# Patient Record
Sex: FEMALE | Race: WHITE | HISPANIC OR LATINO
[De-identification: names, ages, dates, MRNs, and addresses within clinical notes are randomized per-mention and may not be internally consistent; named-entity substitution may affect disease eponyms.]

---

## 2021-01-19 ENCOUNTER — TRANSCRIPTION ENCOUNTER (OUTPATIENT)
Age: 31
End: 2021-01-19

## 2021-03-10 ENCOUNTER — TRANSCRIPTION ENCOUNTER (OUTPATIENT)
Age: 31
End: 2021-03-10

## 2021-07-13 ENCOUNTER — TRANSCRIPTION ENCOUNTER (OUTPATIENT)
Age: 31
End: 2021-07-13

## 2021-10-27 ENCOUNTER — TRANSCRIPTION ENCOUNTER (OUTPATIENT)
Age: 31
End: 2021-10-27

## 2022-03-10 PROBLEM — Z00.00 ENCOUNTER FOR PREVENTIVE HEALTH EXAMINATION: Status: ACTIVE | Noted: 2022-03-10

## 2022-03-16 ENCOUNTER — APPOINTMENT (OUTPATIENT)
Dept: PEDIATRIC ALLERGY IMMUNOLOGY | Facility: CLINIC | Age: 32
End: 2022-03-16
Payer: COMMERCIAL

## 2022-03-16 VITALS
HEIGHT: 69 IN | WEIGHT: 266.5 LBS | BODY MASS INDEX: 39.47 KG/M2 | DIASTOLIC BLOOD PRESSURE: 72 MMHG | SYSTOLIC BLOOD PRESSURE: 114 MMHG

## 2022-03-16 DIAGNOSIS — J30.9 ALLERGIC RHINITIS, UNSPECIFIED: ICD-10-CM

## 2022-03-16 DIAGNOSIS — T78.1XXA OTHER ADVERSE FOOD REACTIONS, NOT ELSEWHERE CLASSIFIED, INITIAL ENCOUNTER: ICD-10-CM

## 2022-03-16 DIAGNOSIS — E73.9 LACTOSE INTOLERANCE, UNSPECIFIED: ICD-10-CM

## 2022-03-16 PROCEDURE — 95004 PERQ TESTS W/ALRGNC XTRCS: CPT

## 2022-03-16 PROCEDURE — 99203 OFFICE O/P NEW LOW 30 MIN: CPT | Mod: 25

## 2022-03-16 NOTE — HISTORY OF PRESENT ILLNESS
[de-identified] : Ambika Hernandez is a 31 year female  with a history of sinus headaches, stomach pain and cramps that gets worse after eating mostly everything especially dairy. Patient states she had dairy allergy as a child but as she grew up it got better. She also went vegan 5 years for 3 years and during that period her symptoms improved. She also saw a nutritionist a few months ago and she was told she is allergic to a lot of foods (wheat, egg, dairy, corn). Patient also has history of seasonal allergies (sinus pressure, congestion and runny nose) which her symptoms are manageable and does not takes allergy medications. Pt is here for an initial consultation to check for possible food allergens. \par \par She says that she gets cramping has intermittent issues.

## 2022-03-16 NOTE — PHYSICAL EXAM
[Alert] : alert [Well Nourished] : well nourished [Healthy Appearance] : healthy appearance [Well Developed] : well developed [No Acute Distress] : no acute distress [Normal Pupil & Iris Size/Symmetry] : normal pupil and iris size and symmetry [No Discharge] : no discharge [No Photophobia] : no photophobia [Sclera Not Icteric] : sclera not icteric [Normal TMs] : both tympanic membranes were normal [Normal Nasal Mucosa] : the nasal mucosa was normal [Normal Lips/Tongue] : the lips and tongue were normal [Normal Outer Ear/Nose] : the ears and nose were normal in appearance [Normal Tonsils] : normal tonsils [No Thrush] : no thrush [Supple] : the neck was supple [Normal Rate and Effort] : normal respiratory rhythm and effort [No Crackles] : no crackles [No Retractions] : no retractions [Bilateral Audible Breath Sounds] : bilateral audible breath sounds [Normal Rate] : heart rate was normal  [Normal S1, S2] : normal S1 and S2 [No murmur] : no murmur [Regular Rhythm] : with a regular rhythm [Normal Cervical Lymph Nodes] : cervical [Skin Intact] : skin intact  [No Rash] : no rash [No Skin Lesions] : no skin lesions [No clubbing] : no clubbing [No Edema] : no edema [No Cyanosis] : no cyanosis [Normal Mood] : mood was normal [Normal Affect] : affect was normal [Alert, Awake, Oriented as Age-Appropriate] : alert, awake, oriented as age appropriate [Pale mucosa] : no pale mucosa

## 2022-03-16 NOTE — SOCIAL HISTORY
[Apartment] : [unfilled] lives in an apartment  [Radiator/Baseboard] : heating provided by radiator(s)/baseboard(s) [Window Units] : air conditioning provided by window units [Humidifier] : uses a humidifier [None] : none [] :  [Dehumidifier] : does not use a dehumidifier [Cockroaches] : Patient states that there are no cockroaches in the home [Bedroom] : not in the bedroom [Basement] : not in the basement [Living Area] : not in the living area [Smokers in Household] : there are no smokers in the home [de-identified] : area leslies

## 2022-03-16 NOTE — REVIEW OF SYSTEMS
[Rhinorrhea] : rhinorrhea [Nasal Congestion] : nasal congestion [Diarrhea] : diarrhea [Abdominal Pain] : abdominal pain [Nl] : Genitourinary [FreeTextEntry4] : sinus headaches  [FreeTextEntry7] : cramps

## 2022-03-16 NOTE — IMPRESSION
[Allergy Testing Dog] : dog [Allergy Testing Cat] : cat [Allergy Testing Trees] : trees [Allergy Testing Grasses] : grasses [________] : [unfilled] [Allergy Testing Dust Mite] : dust mites [Allergy Testing Mixed Feathers] : feathers [Allergy Testing Cockroach] : cockroach [] : molds [Allergy Testing Weeds] : weeds

## 2022-03-16 NOTE — ASSESSMENT
[FreeTextEntry1] : 1. AR - SPT to ENV - fluticasone nasal. \par \par 2. Lactose intolerance - Trial avoidance and eating \par \par 3. Adverse food reactions - Probiotic supplementation

## 2022-06-15 ENCOUNTER — APPOINTMENT (OUTPATIENT)
Dept: PEDIATRIC ALLERGY IMMUNOLOGY | Facility: CLINIC | Age: 32
End: 2022-06-15

## 2023-10-17 ENCOUNTER — NON-APPOINTMENT (OUTPATIENT)
Age: 33
End: 2023-10-17

## 2024-01-25 ENCOUNTER — APPOINTMENT (OUTPATIENT)
Dept: OBGYN | Facility: CLINIC | Age: 34
End: 2024-01-25
Payer: COMMERCIAL

## 2024-01-25 VITALS
WEIGHT: 260 LBS | BODY MASS INDEX: 37.22 KG/M2 | DIASTOLIC BLOOD PRESSURE: 83 MMHG | HEIGHT: 70 IN | SYSTOLIC BLOOD PRESSURE: 122 MMHG | HEART RATE: 94 BPM

## 2024-01-25 LAB
HCG UR QL: POSITIVE
QUALITY CONTROL: YES

## 2024-01-25 PROCEDURE — 99213 OFFICE O/P EST LOW 20 MIN: CPT

## 2024-01-25 PROCEDURE — 81025 URINE PREGNANCY TEST: CPT

## 2024-01-25 NOTE — PLAN
[FreeTextEntry1] : OFFICE TRANSVAGINAL SONOGRAM CONSISTENT WITH 9 WEEKS AND 1 DAY EDC OF 8/24/24 LAST MENSES WAS 11/18/23 . LABS ORDERED FOR PRENATAL PROFILE AND THROMBOPHILIA WORK UP . ALSO HER MOTHER HAS DIABETES. RTO 2 WEEKS

## 2024-01-25 NOTE — HISTORY OF PRESENT ILLNESS
[FreeTextEntry1] : Patient in office for confirmation of pregnancy. Patient had been confirmed to be pregnant by another physician and on progesterone 100 mg vaginal suppos for low progesterone.FAMILY HISTORY OF DVT MOTHER HAS ANTIPHOSPHOLIPID SYNDROME WITH =MTHFR MUTATION AND PROTHROMBIN GENE MUTATION POSITIVE AND PARTIAL PROTEIN C AND S DEFICIENCY . PATIENT HAS NEVER HAD A DVT. PATIENT HAS A PERSONAL HISTORY OF CLEFT LIP AT BIRTH THAT CLOSED SPONTANEOUSLY. PATIENT HAS ALLERGY TO PCN. DENIES VAGINAL BLEEDING AND ON DICLEGIS FOR NAUSEA.

## 2024-02-02 ENCOUNTER — LABORATORY RESULT (OUTPATIENT)
Age: 34
End: 2024-02-02

## 2024-02-06 ENCOUNTER — APPOINTMENT (OUTPATIENT)
Dept: ANTEPARTUM | Facility: CLINIC | Age: 34
End: 2024-02-06
Payer: COMMERCIAL

## 2024-02-06 ENCOUNTER — ASOB RESULT (OUTPATIENT)
Age: 34
End: 2024-02-06

## 2024-02-06 ENCOUNTER — OUTPATIENT (OUTPATIENT)
Dept: OUTPATIENT SERVICES | Facility: HOSPITAL | Age: 34
LOS: 1 days | End: 2024-02-06
Payer: COMMERCIAL

## 2024-02-06 DIAGNOSIS — Z34.90 ENCOUNTER FOR SUPERVISION OF NORMAL PREGNANCY, UNSPECIFIED, UNSPECIFIED TRIMESTER: ICD-10-CM

## 2024-02-06 LAB
25(OH)D3 SERPL-MCNC: 25 NG/ML
ABO + RH PNL BLD: NORMAL
BASOPHILS # BLD AUTO: 0.03 K/UL
BASOPHILS NFR BLD AUTO: 0.4 %
BLD GP AB SCN SERPL QL: NORMAL
CARDIOLIPIN AB SER IA-ACNC: NEGATIVE
CONFIRM: NORMAL
DEPRECATED CARDIOLIPIN IGA SER: <5 APL
DRVVT IMM 1:2 NP PPP: NORMAL
DRVVT SCREEN TO CONFIRM RATIO: 1.13 RATIO
EOSINOPHIL # BLD AUTO: 0.07 K/UL
EOSINOPHIL NFR BLD AUTO: 0.8 %
ESTIMATED AVERAGE GLUCOSE: 108 MG/DL
HBA1C MFR BLD HPLC: 5.4 %
HBV SURFACE AG SER QL: NONREACTIVE
HCG SERPL-MCNC: ABNORMAL MIU/ML
HCT VFR BLD CALC: 37.6 %
HCV AB SER QL: NONREACTIVE
HCV S/CO RATIO: 0.11 S/CO
HGB A MFR BLD: 97.6 %
HGB A2 MFR BLD: 2.4 %
HGB BLD-MCNC: 12.6 G/DL
HGB FRACT BLD-IMP: NORMAL
HIV1+2 AB SPEC QL IA.RAPID: NONREACTIVE
IMM GRANULOCYTES NFR BLD AUTO: 0.2 %
LYMPHOCYTES # BLD AUTO: 2.38 K/UL
LYMPHOCYTES NFR BLD AUTO: 28.7 %
MAN DIFF?: NORMAL
MCHC RBC-ENTMCNC: 29.6 PG
MCHC RBC-ENTMCNC: 33.5 G/DL
MCV RBC AUTO: 88.5 FL
MONOCYTES # BLD AUTO: 0.59 K/UL
MONOCYTES NFR BLD AUTO: 7.1 %
NEUTROPHILS # BLD AUTO: 5.21 K/UL
NEUTROPHILS NFR BLD AUTO: 62.8 %
PLATELET # BLD AUTO: 323 K/UL
PMV BLD AUTO: 0 /100 WBCS
PROGEST SERPL-MCNC: 19.46 NG/ML
PROT S AG ACT/NOR PPP IA: 73 %
RBC # BLD: 4.25 M/UL
RBC # FLD: 12.9 %
RUBV IGG FLD-ACNC: 1.4 INDEX
RUBV IGG SER-IMP: POSITIVE
SCREEN DRVVT: NORMAL
SILICA CLOTTING TIME INTERPRETATION: NORMAL
SILICA CLOTTING TIME S/C: 1.1 RATIO
T PALLIDUM AB SER QL IA: NEGATIVE
T4 FREE SERPL-MCNC: 1.1 NG/DL
TSH SERPL-ACNC: 1.74 UIU/ML
WBC # FLD AUTO: 8.3 K/UL

## 2024-02-06 PROCEDURE — 76801 OB US < 14 WKS SINGLE FETUS: CPT | Mod: 26

## 2024-02-06 PROCEDURE — 76813 OB US NUCHAL MEAS 1 GEST: CPT | Mod: 26

## 2024-02-06 PROCEDURE — 76801 OB US < 14 WKS SINGLE FETUS: CPT

## 2024-02-06 PROCEDURE — 76813 OB US NUCHAL MEAS 1 GEST: CPT

## 2024-02-07 DIAGNOSIS — O99.211 OBESITY COMPLICATING PREGNANCY, FIRST TRIMESTER: ICD-10-CM

## 2024-02-07 DIAGNOSIS — Z36.89 ENCOUNTER FOR OTHER SPECIFIED ANTENATAL SCREENING: ICD-10-CM

## 2024-02-07 DIAGNOSIS — Z36.82 ENCOUNTER FOR ANTENATAL SCREENING FOR NUCHAL TRANSLUCENCY: ICD-10-CM

## 2024-02-07 DIAGNOSIS — Z3A.11 11 WEEKS GESTATION OF PREGNANCY: ICD-10-CM

## 2024-02-08 ENCOUNTER — APPOINTMENT (OUTPATIENT)
Dept: OBGYN | Facility: CLINIC | Age: 34
End: 2024-02-08
Payer: COMMERCIAL

## 2024-02-08 LAB
BILIRUB UR QL STRIP: NORMAL
CLARITY UR: CLEAR
COLLECTION METHOD: NORMAL
GLUCOSE UR-MCNC: NORMAL
HCG UR QL: 0.2 EU/DL
HGB UR QL STRIP.AUTO: NORMAL
KETONES UR-MCNC: NORMAL
LEUKOCYTE ESTERASE UR QL STRIP: NORMAL
NITRITE UR QL STRIP: NORMAL
PH UR STRIP: 7
PROT UR STRIP-MCNC: NORMAL
SP GR UR STRIP: 1.02

## 2024-02-08 PROCEDURE — 81003 URINALYSIS AUTO W/O SCOPE: CPT | Mod: NC,QW

## 2024-02-08 PROCEDURE — 0502F SUBSEQUENT PRENATAL CARE: CPT

## 2024-02-22 ENCOUNTER — APPOINTMENT (OUTPATIENT)
Dept: OBGYN | Facility: CLINIC | Age: 34
End: 2024-02-22
Payer: COMMERCIAL

## 2024-02-22 VITALS
HEIGHT: 70 IN | DIASTOLIC BLOOD PRESSURE: 81 MMHG | BODY MASS INDEX: 38.08 KG/M2 | WEIGHT: 266 LBS | SYSTOLIC BLOOD PRESSURE: 114 MMHG | HEART RATE: 118 BPM

## 2024-02-22 PROCEDURE — 0502F SUBSEQUENT PRENATAL CARE: CPT

## 2024-02-22 PROCEDURE — 81003 URINALYSIS AUTO W/O SCOPE: CPT | Mod: NC,QW

## 2024-02-26 LAB
APCR PPP: 2.58 RATIO
AR GENE MUT ANL BLD/T: NORMAL
CFTR MUT TESTED BLD/T: NEGATIVE
CHROMOSOME13 INTERPRETATION: NORMAL
CHROMOSOME13 TEST RESULT: NORMAL
CHROMOSOME18 INTERPRETATION: NORMAL
CHROMOSOME18 TEST RESULT: NORMAL
CHROMOSOME21 INTERPRETATION: NORMAL
CHROMOSOME21 TEST RESULT: NORMAL
DNA PLOIDY SPEC FC-IMP: NORMAL
FETAL FRACTION: NORMAL
FMR1 GENE MUT ANL BLD/T: NORMAL
FVL BLANK: 36.9
FVL TEST: 95.2
HLX MTHFR FINAL REPORT: NORMAL
MICRODELETIONS INTERPRETATION: NORMAL
MICRODELETIONS RESULT: NORMAL
PERFORMANCE AND LIMITATIONS: NORMAL
PTR INTERP: NORMAL
SEX CHROMOSOME INTERPRETATION: NORMAL
SEX CHROMOSOME TEST RESULT: NORMAL
VERIFI WITH MICRODELETIONS: NOT DETECTED

## 2024-03-04 ENCOUNTER — APPOINTMENT (OUTPATIENT)
Dept: ANTEPARTUM | Facility: CLINIC | Age: 34
End: 2024-03-04
Payer: COMMERCIAL

## 2024-03-04 ENCOUNTER — OUTPATIENT (OUTPATIENT)
Dept: OUTPATIENT SERVICES | Facility: HOSPITAL | Age: 34
LOS: 1 days | End: 2024-03-04
Payer: COMMERCIAL

## 2024-03-04 VITALS
SYSTOLIC BLOOD PRESSURE: 119 MMHG | DIASTOLIC BLOOD PRESSURE: 73 MMHG | BODY MASS INDEX: 38.31 KG/M2 | OXYGEN SATURATION: 99 % | WEIGHT: 267 LBS | TEMPERATURE: 98.2 F

## 2024-03-04 DIAGNOSIS — O09.90 SUPERVISION OF HIGH RISK PREGNANCY, UNSPECIFIED, UNSPECIFIED TRIMESTER: ICD-10-CM

## 2024-03-04 DIAGNOSIS — O99.119 OTHER DISEASES OF THE BLOOD AND BLOOD-FORMING ORGANS AND CERTAIN DISORDERS INVOLVING THE IMMUNE MECHANISM COMPLICATING PREGNANCY, UNSPECIFIED TRIMESTER: ICD-10-CM

## 2024-03-04 DIAGNOSIS — D68.59 OTHER PRIMARY THROMBOPHILIA: ICD-10-CM

## 2024-03-04 DIAGNOSIS — D68.59 OTHER DISEASES OF THE BLOOD AND BLOOD-FORMING ORGANS AND CERTAIN DISORDERS INVOLVING THE IMMUNE MECHANISM COMPLICATING PREGNANCY, UNSPECIFIED TRIMESTER: ICD-10-CM

## 2024-03-04 DIAGNOSIS — Z33.1 PREGNANT STATE, INCIDENTAL: ICD-10-CM

## 2024-03-04 PROCEDURE — 96040: CPT

## 2024-03-04 PROCEDURE — 99212 OFFICE O/P EST SF 10 MIN: CPT

## 2024-03-04 PROCEDURE — 99212 OFFICE O/P EST SF 10 MIN: CPT | Mod: 25

## 2024-03-04 PROCEDURE — 81002 URINALYSIS NONAUTO W/O SCOPE: CPT

## 2024-03-04 NOTE — DISCUSSION/SUMMARY
[FreeTextEntry1] : 34yo  15.4 [place] 2024 presents for consultation for inherited prothrombin deficiency, currently managed by Dr. Smith.  Per patient, her mother has a history of prothombin mutation and lupus anticoagulant positive that was found after she was noted to have multiple DVTs in her leg. Her mother also has a hx of multiple first trimester miscarriages. Patients declines personal history of VTE, declines hx of miscarriages. Patients states she has a hx of cleft lip that resolved in utero. This is a naturally conceived pregnancy.  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, denies smoking, drinking, drug use  Vitals: 119/73, , SaO2 99%  Heart: S1, S2, no murmurs rubs or gallops Lungs: CTAB Abdomen: no tenderness, non-distended, gravid LE: no edema, no tenderness  Labs: CF neg, MTHFR heterozygous, Fragile X neg, Factor V neg, SMN1 neg, AT III antigen 22 (n), protein C resistance assay (n), cardiolipin (n), AT III activity 80% (L), protein S antigen (n), hemoglobin electrophoresis (n), B2G screen neg, rubella immune, HCV neg, Hep B NR, RPR NR, HIV NR,  aPTT wnl, O pos, antibody neg, free T4 1.1, TSH 1.74, Hb 5.4%, protein C 155% (H),   Ultrasounds: 11w3d: NT 2.63 (99%)  34yo  15.4 [place] 2024 presents for consultation for inherited prothrombin deficiency.  #prothrombin heterozygote -we discussed that given that prothrombin mutation is considered "low risk" by ACOG, without a personal history of VTE increases the risk but by so little that anticoagulation in not indicated in pregnancy. And with a family hx of first degree relative with VTE, it is optional. However, in light of obesity suggest Lovenox prophylaxis antepartum and postpartum.  her lupus labs have not resulted as of today. If lupus anticoagulant is positive, she will qualify for anticoagulation. -f/u lupus labs -s/p genetic consultation with genetic counselor  #Obesity  -We discussed that obesity is associated with a host of pregnancy complications.   -The patient is at increased risk for preeclampsia and gestational hypertension,  delivery, macrosomia, gestational diabetes, deep venous thrombosis,  delivery, stillbirth and certain birth defects, such as open neural tube defects.  - I recommend weekly biophysical profiles to start at around 32 weeks gestation.  - recommend early GCT, script given to patient  #MTFR mutation -recommend folate supplementation  #thickened nuchal -Verifi low risk, patient declining invasive testing  #pregnancy -recommend PNV -recommend ASA -AFP script sent -routine care per Dr. Smith  f/u for anatomy scan, or earlier for RPN if patient lupus labs are positive.

## 2024-03-06 NOTE — HISTORY OF PRESENT ILLNESS
[FreeTextEntry1] : REASON FOR VISIT: Ms. Ambika Hernandez is a 33-year-old  female of [place] descent with a gestational age of 15 weeks and 2 days based on an ARGENT[place] of 2024 who was seen for genetic counseling through [facility] on 3/4/2024 for a history of blood clot disorders. The patient was accompanied by her .   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 We discussed hereditary thrombophilia and the [id number] prothrombin phenotype. Hereditary thrombophilia is a predisposition to developing harmful blood clots. Harmful blood clots are more generally known as venous thromboembolism (VTE). When they form in the legs, the condition is known as deep vein thrombosis (DVT). When they travel to the lungs, the condition is known as pulmonary embolism (PE).  The [id number] point mutation in the prothrombin (coagulation factor II; F2) gene resulting from a substitution of adenine (A) for guanine (G) at position 82950 in a non-coding region of the gene. Inheritance is considered to be autosomal dominant, and the risk of VTE in heterozygotes is increased approximately 2-4 fold over controls. In  populations, the prevalence of prothrombin [id number] is approximately 1-6%, with an overall prevalence of approximately 2%.  There is contradictory literature on the association between maternal inherited thrombophilia and obstetric complications such as spontaneous , fetal loss, and stillbirth. Data at this time does not support the use of anticoagulants for E37341I prothrombin carriers with no prior history of blood clots to reduce the risk of obstetric complications. Estrogen-containing contraceptives are general avoided for individuals with prothrombin [id number] who have not had a VTE.    #Thickened Nuchal Translucency A sonogram was performed on __ and found a nuchal translucency (NT) measurement at the 99th percentile (__ mm) for this patients gestational age.  This patient was informed that an NT measurement at the the 99th percentile can be associated with an increased risk of chromosome abnormalities. An increased NT measurement is also associated with an increased risk for structural defects (eg. CHD). Level II anatomy scan at about 20 weeks gestation as well as a fetal echocardiogram at 18-22 weeks gestation are recommended due to the association of an increased NT and structural defects.   This patient was informed that for fetuses with NT measurement between the 95th-99th percentile, the vast majority of these cases will have normal pediatric outcomes.  The patient previously had cell-free fetal DNA screening performed for this pregnancy which was low risk for aneuploidies for the chromosomes studied (21, 18, 13, X, Y). Cell-free fetal DNA screening demonstrated a pregnancy consistent with male.   We discussed the option of prenatal diagnosis to detect chromosome abnormalities. We also discussed the option of limited chromosome screening with cell-free fetal DNA screening. The option of adding microarray analysis to prenatal diagnosis was recommended due to the increased occurrence of  microdeletion and microduplication syndromes in pregnancies with birth defects. This patient was also informed of the association of genetic syndromes (sporadic or inherited) with an increased NT.  One genetic syndrome discussed, that has been associated with an increased NT, and can be detected by prenatal diagnosis was Xochitl syndrome.    The patient was given the option of cell-free fetal DNA screening or diagnostic testing.  The risks, benefits, and limitations of each option were explained including the 1/400 risk of miscarriage or other complication with invasive testing. Diagnostic testing also allows for microarray analysis. Unlike CVS and amniocentesis which are diagnostic procedures, cell-free fetal DNA screening is a screening test with very high sensitivity and specificity for trisomies 21, 18, and 13. It can also detect sex chromosome aneuploidy and several microdeletion syndromes but with lower sensitivity and specificity. If cell-free fetal DNA screening is positive, ultrasound and amniocentesis are recommended for confirmation.  If cell-free fetal DNA screening is negative, it is not a guarantee of an unaffected child.  She also has the option not to test.   She was also offered [facility], cell-free fetal DNA screening which can screen for single gene disorders, such as [place] syndrome, as well as [facility], cell-free fetal DNA screening to screen all 23 pairs of chromosomes. The patient opted to pursue [facility] and [facility].  The patient declined diagnostic/invasive testing at this time.  FAMILY HISTORY: A 3 generation pedigree was obtained and will be scanned into the medical record. Of note, we discussed the following significant family history:   #Cancer This patient stated her maternal grandmother  of ovarian cancer in her early 50s. The patient was informed that while most cancer occurrences are sporadic in origin about 13% of cancer is inherited. We reviewed the availability of cancer genetic counseling and DNA testing, as per this patient's interest. The patient declined testing at this time.  The family history was otherwise negative for known significant birth defects, intellectual disability, consanguinity and known genetic diseases.  There is a 3-5% background risk for any birth defect or developmental issue with every pregnancy.     SUMMARY AND PLAN: - No further testing was ordered today.   The patient expressed understanding and all of her questions were answered in detail. If you have any additional questions, please feel free to call me at [phone] or [phone]. Thank you for referring this patient.   Sincerely,   Codie Dsouza MS, [facility] Genetic Counselor

## 2024-03-07 ENCOUNTER — APPOINTMENT (OUTPATIENT)
Dept: OBGYN | Facility: CLINIC | Age: 34
End: 2024-03-07

## 2024-03-07 ENCOUNTER — APPOINTMENT (OUTPATIENT)
Dept: MATERNAL FETAL MEDICINE | Facility: CLINIC | Age: 34
End: 2024-03-07
Payer: COMMERCIAL

## 2024-03-07 VITALS
WEIGHT: 270 LBS | HEART RATE: 107 BPM | HEIGHT: 70 IN | BODY MASS INDEX: 38.65 KG/M2 | DIASTOLIC BLOOD PRESSURE: 80 MMHG | SYSTOLIC BLOOD PRESSURE: 121 MMHG

## 2024-03-07 PROCEDURE — 99214 OFFICE O/P EST MOD 30 MIN: CPT

## 2024-03-07 RX ORDER — ISOPROPYL ALCOHOL 0.7 ML/ML
SWAB TOPICAL
Qty: 120 | Refills: 2 | Status: DISCONTINUED | COMMUNITY
Start: 2024-03-04 | End: 2024-03-07

## 2024-03-07 RX ORDER — ENOXAPARIN SODIUM 40 MG/.4ML
40 INJECTION, SOLUTION SUBCUTANEOUS DAILY
Qty: 30 | Refills: 5 | Status: DISCONTINUED | COMMUNITY
Start: 2024-03-04 | End: 2024-03-07

## 2024-03-11 ENCOUNTER — APPOINTMENT (OUTPATIENT)
Dept: ANTEPARTUM | Facility: CLINIC | Age: 34
End: 2024-03-11
Payer: COMMERCIAL

## 2024-03-11 ENCOUNTER — APPOINTMENT (OUTPATIENT)
Dept: MATERNAL FETAL MEDICINE | Facility: CLINIC | Age: 34
End: 2024-03-11
Payer: COMMERCIAL

## 2024-03-11 ENCOUNTER — ASOB RESULT (OUTPATIENT)
Age: 34
End: 2024-03-11

## 2024-03-11 VITALS
SYSTOLIC BLOOD PRESSURE: 120 MMHG | HEIGHT: 70 IN | HEART RATE: 100 BPM | BODY MASS INDEX: 38.51 KG/M2 | WEIGHT: 269 LBS | DIASTOLIC BLOOD PRESSURE: 70 MMHG

## 2024-03-11 LAB
BILIRUB UR QL STRIP: NORMAL
BP DIAS: 73 MM HG
BP SYS: 119 MM HG
CLARITY UR: CLEAR
COLLECTION METHOD: NORMAL
FETAL HEART RATE (BPM): 155
GLUCOSE UR-MCNC: NORMAL
HCG UR QL: 0.2 EU/DL
HGB UR QL STRIP.AUTO: NORMAL
KETONES UR-MCNC: NORMAL
LEUKOCYTE ESTERASE UR QL STRIP: NORMAL
NITRITE UR QL STRIP: NORMAL
OB COMMENTS: NORMAL
PH UR STRIP: 7
PROT UR STRIP-MCNC: NORMAL
SCHEDULED VISIT: YES
SP GR UR STRIP: 1.01
URINE ALBUMIN/PROTEIN: NORMAL
URINE GLUCOSE: NORMAL
URINE KETONES: NORMAL
WEEKS GESTATION: 15.4

## 2024-03-11 PROCEDURE — 76805 OB US >/= 14 WKS SNGL FETUS: CPT

## 2024-03-11 PROCEDURE — 76817 TRANSVAGINAL US OBSTETRIC: CPT

## 2024-03-11 PROCEDURE — 99213 OFFICE O/P EST LOW 20 MIN: CPT | Mod: 25

## 2024-03-11 NOTE — DISCUSSION/SUMMARY
[FreeTextEntry1] : 3/11/24 [facility] Att'g f/u Consult Note: Ms Hernandez is referred by Dr Smith. 32yo  16w2d (ARGENT[place] 08. by LMP c/w 11wMFMUS) returns for preliminary anatomic survey and f/u consultation.  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 - Antithrombin III "assay" = 80% (lower limits of nl); ATIII Ag is normal - MTHFR Heterozygote, on folate. PMHx: prothrombin heterozygote  No personal Hx of VTE or pregnancy loss. Meds: claritin PRN Allergies: cats Surgery: wisdom teeth, no complications. FHx. Per patient, her mother has a history of prothombin mutation and lupus anticoagulant positive that was found after she was noted to have multiple DVTs in her leg. Her mother also has a hx of multiple first trimester miscarriages. Patients declines personal history of VTE, declines hx of miscarriages. Patients states she has a hx of cleft lip that resolved in utero. Also, mother has DM2. Father has had an MI, pacemaker. Family Hx: Mom: T2DM, lupus, prothrombin mutation, pos lupus anticoagulant. 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 Vitals: 120/70,  5'10", 269#. BMI 39.  Abdomen: no tenderness, non-distended, gravid LE: no edema, no tenderness  Labs: Opos/AntibNeg; HbEP AA; Verifi NIPS risk reducing.    HCV neg, Hep B NR, RPR NR, HIV NR, aPTT wnl, free T4 1.1, TSH 1.74,    CF neg, Fragile X neg, MTHFR heterozygous, Factor V neg, SMN1 neg, AT III antigen 22 (n), protein C resistance assay (n),     cardiolipin (n), AT III activity 80% (L), protein S antigen (n), B2G screen neg, rubella immune, Hb 5.4%, protein C 155% (H),   2/6:  8k>13/38%<323k BUN/SCr: 8/0.8; AST/ALT: 18/10.  MFMUS: 24: SIUP at 11w3d ([place]  by LMPc/w 11wCRL). NT 2.63 (99%ile) 3/11/24:  Single fetus, variable lie at 16w2d. Ant plac. DVP 3.9cm,  EFW 34th%ile. NF 2.5mm, anatomy normal limited.  : Comprehensive anatomic survey scheduled.   Impression/Recommendations: 33y  15.4 [place] 2024 presents for consultation for inherited prothrombin deficiency. 1. Prothrombin Gene mutation heterozygote: We previously discussed that prothrombin mutation is considered "low risk" by ACOG. Without a personal history of VTE, PT heterozygosity increases the risk of VTE slightly, and generally risk of prophylaxis is greater than potential benefit. With a family hx of first degree relative with VTE, prophylaxis is optional. Pt would prefer not to take enoxaparin during pregnancy unless clearly indicated. Given her BMI = 38, prophylaxis would be reasonable in the 1st 6w postpartum, to which she agrees, but prefers po prophylaxis. Warfarin is safe during breastfeeding, so that may be prophylaxis of choice, despite need for monitoring. -->VTE prophylaxis x6w PP: Enoxaparin 40mg SQ dailystarting 24h after delivery. Pt may transition to warfarin as she prefers. 2. MTHFR Heterozygote: Pt is taking folate already. 3. Low normal AT III Assay, with normal AT III Ag: With no personal Hx VTE, these findings are normal. 4. FVLeiden, Lupus Anticoagulant Neg. Noted. -s/p genetic consultation with genetic counselor 5. BMI 38: Pt has been counseled regarding incr risk of preecl, CSx, GDM, LGA and other complications. -->Weekly biophysical profiles to start at around 32 weeks gestation. -->Early GCT, script given to patient 6. Increased NT: 2.6mm is below upper limits of normal.  -Verifi low risk, patient declined invasive testing 7. Pregnancy -recommend ASA MSAFP script sent -routine care per Dr. Smith -->RT  for early anatomy.  f/u for anatomy scan and consult .   MD Carlos, FACOG

## 2024-03-11 NOTE — DISCUSSION/SUMMARY
[FreeTextEntry1] : 3/7/24 [facility] Att'g f/u Consult Note:  Ms Hernandez is referred by Dr Smith.   32yo  15w6d ([place]  by LMP c/w 11wMFMUS) presents for consultation for   - PT heterozygous for the Factor II [id number] mutation, c.97G>A.    - Antithrombin III "assay" = 80% (lower limits of nl); ATIII Ag is normal - MTHFR Heterozygote, on folate.  PMHx: prothrombin heterozygote             No personal Hx of VTE or pregnancy loss.  Meds: claritin PRN Allergies: cats Surgery: wisdom teeth, no complications.  FHx. Per patient, her mother has a history of prothombin mutation and lupus anticoagulant positive that was found after she was noted to have multiple DVTs in her leg. Her mother also has a hx of multiple first trimester miscarriages. Patients declines personal history of VTE, declines hx of miscarriages. Patients states she has a hx of cleft lip that resolved in utero. This is a naturally conceived pregnancy. Also, mother has DM2.  Father has had an MI, pacemaker.  Family Hx: Mom: T2DM, lupus, prothrombin mutation, pos lupus anticoagulant.   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  Vitals: 121/80,  5'10", 270#. BMI 39.   Heart: S1, S2, no murmurs rubs or gallops Lungs: CTAB Abdomen: no tenderness, non-distended, gravid LE: no edema, no tenderness  Labs:  Opos/AntibNeg;  HbEP AA;  Verifi NIPS risk reducing.    HCV neg, Hep B NR, RPR NR, HIV NR, aPTT wnl, free T4 1.1, TSH 1.74,    CF neg, MTHFR heterozygous, Fragile X neg, Factor V neg, SMN1 neg, AT III antigen 22 (n), protein C resistance assay (n), cardiolipin (n), AT III activity 80% (L), protein S antigen (n), B2G screen neg, rubella immune, Hb 5.4%, protein C 155% (H), : 8k>13/38%<323k  BUN/SCr: 8/0.8; AST/ALT: 18/10.  MFMUS:    24:  SIUP at 11w3d ([place]  by LMPc/w 11wCRL). NT 2.63 (99%ile)  Impression/Recommendations:  32yo  15.4 [place] 2024 presents for consultation for inherited prothrombin deficiency. 1. Prothrombin Gene mutation heterozygote:   We discussed that given that prothrombin mutation is considered "low risk" by ACOG.  Without a personal history of VTE, PT heterozygosity increases the risk of VTE slightly, and generally risk of prophylaxis is greater than potential benefit.  With a family hx of first degree relative with VTE, prophylaxis is optional.  Pt would prefer not to take enoxaparin during pregnancy unless clearly indicated.  Given her BMI = 38, prophylaxis would be reasonable in the 1st 6w postpartum.  She would prefer po prophylaxis.   Warfarin is safe during breastfeeding, so that may be prophylaxis of choice, despite need for monitoring.   -->Enoxaparin 40mg SQ daily x6w post partum.  Pt may transition to warfarin if she prefers.   2. MTHFR Heterozygote:  Pt is taking folate already.  3. Low normal AT III Assay, with normal AT III Ag:  With no personal Hx VTE, these findings are normal.  4. FVLeiden, Lupus Anticoagulant Neg.  Noted.  -s/p genetic consultation with genetic counselor 5. BMI 38:  Pt has been counseled regarding incr risk of preecl, CSx, GDM, LGA and other complications.  -->Weekly biophysical profiles to start at around 32 weeks gestation. -->Early GCT, script given to patient 6. thickened nuchal -Verifi low risk, patient declining invasive testing 7. Pregnancy -recommend ASA MSAFP script sent -routine care per Dr. Smith -->RT 3/11 for early anatomy.   f/u for anatomy scan  MD Carlos, FACOG

## 2024-03-13 ENCOUNTER — APPOINTMENT (OUTPATIENT)
Dept: ANTEPARTUM | Facility: CLINIC | Age: 34
End: 2024-03-13

## 2024-03-15 DIAGNOSIS — Z3A.15 15 WEEKS GESTATION OF PREGNANCY: ICD-10-CM

## 2024-03-15 DIAGNOSIS — O99.212 OBESITY COMPLICATING PREGNANCY, SECOND TRIMESTER: ICD-10-CM

## 2024-03-15 DIAGNOSIS — Z34.90 ENCOUNTER FOR SUPERVISION OF NORMAL PREGNANCY, UNSPECIFIED, UNSPECIFIED TRIMESTER: ICD-10-CM

## 2024-03-15 DIAGNOSIS — O99.119 OTHER DISEASES OF THE BLOOD AND BLOOD-FORMING ORGANS AND CERTAIN DISORDERS INVOLVING THE IMMUNE MECHANISM COMPLICATING PREGNANCY, UNSPECIFIED TRIMESTER: ICD-10-CM

## 2024-03-21 ENCOUNTER — APPOINTMENT (OUTPATIENT)
Dept: OBGYN | Facility: CLINIC | Age: 34
End: 2024-03-21
Payer: COMMERCIAL

## 2024-03-21 VITALS
DIASTOLIC BLOOD PRESSURE: 82 MMHG | HEART RATE: 97 BPM | BODY MASS INDEX: 37.94 KG/M2 | HEIGHT: 70 IN | SYSTOLIC BLOOD PRESSURE: 137 MMHG | WEIGHT: 265 LBS

## 2024-03-21 PROCEDURE — 0502F SUBSEQUENT PRENATAL CARE: CPT

## 2024-04-10 ENCOUNTER — APPOINTMENT (OUTPATIENT)
Dept: ANTEPARTUM | Facility: CLINIC | Age: 34
End: 2024-04-10

## 2024-04-11 ENCOUNTER — APPOINTMENT (OUTPATIENT)
Dept: MATERNAL FETAL MEDICINE | Facility: CLINIC | Age: 34
End: 2024-04-11
Payer: COMMERCIAL

## 2024-04-11 ENCOUNTER — APPOINTMENT (OUTPATIENT)
Dept: ANTEPARTUM | Facility: CLINIC | Age: 34
End: 2024-04-11
Payer: COMMERCIAL

## 2024-04-11 ENCOUNTER — ASOB RESULT (OUTPATIENT)
Age: 34
End: 2024-04-11

## 2024-04-11 VITALS
WEIGHT: 267 LBS | SYSTOLIC BLOOD PRESSURE: 128 MMHG | DIASTOLIC BLOOD PRESSURE: 78 MMHG | HEART RATE: 116 BPM | HEIGHT: 70 IN | BODY MASS INDEX: 38.22 KG/M2

## 2024-04-11 DIAGNOSIS — O99.212 OBESITY COMPLICATING PREGNANCY, SECOND TRIMESTER: ICD-10-CM

## 2024-04-11 DIAGNOSIS — E66.01 OBESITY COMPLICATING PREGNANCY, SECOND TRIMESTER: ICD-10-CM

## 2024-04-11 PROCEDURE — 76811 OB US DETAILED SNGL FETUS: CPT

## 2024-04-11 PROCEDURE — 99215 OFFICE O/P EST HI 40 MIN: CPT | Mod: 25

## 2024-04-11 PROCEDURE — 76817 TRANSVAGINAL US OBSTETRIC: CPT

## 2024-04-11 NOTE — DISCUSSION/SUMMARY
[FreeTextEntry1] : 25 M Att'g f/u Consult Note: Ms Hernandez is referred by Dr Smith. 34yo  20w5d ([place]  by LMP c/w 11wMFMUS) returns for comprehensive anatomic survey and f/u consultation. She admits to occasional dizziness when standing, but otherwise feels very well.  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 - Antithrombin III "assay" = 80% (lower limits of nl); ATIII Ag is normal - MTHFR Heterozygote, on folate. PMHx: prothrombin heterozygote    No personal Hx of VTE or pregnancy loss. Meds: claritin PRN            ASA 81mg po daily            Folate 1mg daily. Allergies: cats Surgery: wisdom teeth, no complications. FHx. Per patient, her mother has a history of prothombin mutation and lupus anticoagulant positive that was found after she was noted to have multiple DVTs in her leg. Her mother also has a hx of multiple first trimester miscarriages. Patients has no personal history of VTE, no hx of miscarriages. She has a hx of congenital cleft lip that resolved in utero. Also, mother has DM2. Father has had an MI, pacemaker. 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 Vitals: 128/78,  5'10", 267<269#. BMI 38.   (US)     Udip () tr prot, neg glu, neg ket.  HEENT: NC/AT.  Upper left appears to have congenitally healed cleft on left, c/w pt history.  Lungs: Clear to ausc bilat. Cor: S1nl, S2phys split.  No mrb. rrr. Abdomen: no tenderness. gravid LE: 1+ pitting edema.   LAB: Opos/AntibNeg; HbEP AA; Verifi NIPS risk reducing. HCV neg, Hep B NR, RPR NR, HIV NR, aPTT wnl, free T4 1.1, TSH 1.74, CF neg, Fragile X neg,  PT heterozygous for the Factor II [id number] mutation, c.97G>A.MTHFR heterozygous, Factor V neg, SMN1 neg, AT III antigen 22 (n), protein C resistance assay (n), cardiolipin (n), AT III activity 80% (L), protein S antigen (n), B2G screen neg, rubella immune, Hb 5.4%, protein C 155% (H), : 8k>13/38%<323k BUN/SCr: 8/0.8; AST/ALT: 18/10.  MFMUS: 25: SIUP at 11w3d ([place]  by LMPc/w 11wCRL). NT 2.63 (99%ile) 3/11/24: Single fetus, variable lie at 16w2d. Ant plac. DVP 3.9cm, EFW 34th%ile. NF 2.5mm, anatomy normal limited. : SF BREECH at 20w5d. Ant plac. DVP 4.3cm. EFW 47th%ile.  Anatomy incomplete.   Impression/Recommendations: 33y  15.4 [place] 2024 presents for consultation for inherited prothrombin deficiency. 1. Prothrombin Gene mutation heterozygote: We previously discussed that prothrombin mutation is considered "low risk" by ACOG. Without a personal history of VTE, PT heterozygosity increases the risk of VTE slightly, and generally risk of prophylaxis is greater than potential benefit. With a family hx of first degree relative with VTE, prophylaxis is optional. Pt would prefer not to take enoxaparin during pregnancy unless clearly indicated. Given her BMI = 38, prophylaxis would be reasonable in the 1st 6w postpartum, to which she agrees, but prefers po prophylaxis. Warfarin is safe during breastfeeding, so that may be prophylaxis of choice, despite need for monitoring. -->VTE prophylaxis x6w PP: Enoxaparin 40mg SQ dailystarting 24h after delivery. Pt may transition to warfarin as she prefers. -->Referral for hematology consult.  Recommend [facility]ematology Dr Bundy.  2. MTHFR Heterozygote: Pt is taking folate already. 3. Low normal AT III Assay, with normal AT III Ag: With no personal Hx VTE, these findings are normal. 4. FVLeiden, Lupus Anticoagulant Neg. Noted. -s/p genetic consultation with genetic counselor 5. BMI 38: Pt has been counseled regarding incr risk of preecl, CSx, GDM, LGA and other complications. -->Weekly biophysical profiles to start at around 32 weeks gestation. -->Early GCT, script given to patient 6. Increased NT: 2.6mm is below upper limits of normal. -Verifi low risk, patient declined invasive testing 7. Pregnancy -recommend ASA MSAFP script sent -routine care per Dr. Smith -->RT  for f/u anatomy & MF Consult  MD Carlos, FACOG

## 2024-04-16 ENCOUNTER — APPOINTMENT (OUTPATIENT)
Dept: OBGYN | Facility: CLINIC | Age: 34
End: 2024-04-16
Payer: COMMERCIAL

## 2024-04-16 LAB
BILIRUB UR QL STRIP: NORMAL
CLARITY UR: CLEAR
COLLECTION METHOD: NORMAL
GLUCOSE UR-MCNC: NORMAL
HCG UR QL: 0.2 EU/DL
HGB UR QL STRIP.AUTO: NORMAL
KETONES UR-MCNC: NORMAL
LEUKOCYTE ESTERASE UR QL STRIP: NORMAL
NITRITE UR QL STRIP: NORMAL
PH UR STRIP: 7
PROT UR STRIP-MCNC: NORMAL
SP GR UR STRIP: 1.01

## 2024-04-16 PROCEDURE — 81003 URINALYSIS AUTO W/O SCOPE: CPT | Mod: NC,QW

## 2024-04-16 PROCEDURE — 0502F SUBSEQUENT PRENATAL CARE: CPT

## 2024-04-19 LAB
AFP MOM: 0.89
AFP VALUE: 40.7 NG/ML
ALPHA FETOPROTEIN SERUM COMMENT: NORMAL
ALPHA FETOPROTEIN SERUM INTERPRETATION: NORMAL
ALPHA FETOPROTEIN SERUM RESULTS: NORMAL
ALPHA FETOPROTEIN SERUM TEST RESULTS: NORMAL
GESTATIONAL AGE BASED ON: NORMAL
GESTATIONAL AGE ON COLLECTION DATE: 20.7 WEEKS
INSULIN DEP DIABETES: NO
MATERNAL AGE AT EDD AFP: 32.8 YR
MULTIPLE GESTATION: NO
OSBR RISK 1 IN: NORMAL
RACE: NORMAL
WEIGHT AFP: 267 LBS

## 2024-04-30 DIAGNOSIS — Z78.9 OTHER SPECIFIED HEALTH STATUS: ICD-10-CM

## 2024-04-30 DIAGNOSIS — Z34.90 ENCOUNTER FOR SUPERVISION OF NORMAL PREGNANCY, UNSPECIFIED, UNSPECIFIED TRIMESTER: ICD-10-CM

## 2024-04-30 DIAGNOSIS — Z82.49 FAMILY HISTORY OF ISCHEMIC HEART DISEASE AND OTHER DISEASES OF THE CIRCULATORY SYSTEM: ICD-10-CM

## 2024-04-30 LAB
ALBUMIN SERPL ELPH-MCNC: 4.1 G/DL
ALP BLD-CCNC: 50 U/L
ALT SERPL-CCNC: 10 U/L
ANION GAP SERPL CALC-SCNC: 13 MMOL/L
AST SERPL-CCNC: 18 U/L
AT III PPP CHRO-ACNC: 80 %
BILIRUB SERPL-MCNC: 0.2 MG/DL
BUN SERPL-MCNC: 8 MG/DL
CALCIUM SERPL-MCNC: 8.9 MG/DL
CHLORIDE SERPL-SCNC: 103 MMOL/L
CO2 SERPL-SCNC: 22 MMOL/L
CREAT SERPL-MCNC: 0.7 MG/DL
EGFR: 118 ML/MIN/1.73M2
GLUCOSE SERPL-MCNC: 80 MG/DL
POTASSIUM SERPL-SCNC: 4 MMOL/L
PROT C PPP CHRO-ACNC: 155 %
PROT SERPL-MCNC: 7.2 G/DL
SODIUM SERPL-SCNC: 138 MMOL/L

## 2024-04-30 RX ORDER — PNV NO.95/FERROUS FUM/FOLIC AC 28MG-0.8MG
TABLET ORAL
Refills: 0 | Status: ACTIVE | COMMUNITY
Start: 2024-04-30

## 2024-04-30 RX ORDER — CHROMIUM 200 MCG
10 MCG TABLET ORAL
Refills: 0 | Status: ACTIVE | COMMUNITY
Start: 2024-04-30

## 2024-04-30 RX ORDER — FLUTICASONE PROPIONATE 50 UG/1
50 SPRAY, METERED NASAL DAILY
Qty: 1 | Refills: 1 | Status: DISCONTINUED | COMMUNITY
Start: 2022-03-16 | End: 2024-04-30

## 2024-05-01 ENCOUNTER — NON-APPOINTMENT (OUTPATIENT)
Age: 34
End: 2024-05-01

## 2024-05-08 ENCOUNTER — APPOINTMENT (OUTPATIENT)
Dept: ANTEPARTUM | Facility: CLINIC | Age: 34
End: 2024-05-08
Payer: COMMERCIAL

## 2024-05-08 ENCOUNTER — APPOINTMENT (OUTPATIENT)
Dept: MATERNAL FETAL MEDICINE | Facility: CLINIC | Age: 34
End: 2024-05-08
Payer: COMMERCIAL

## 2024-05-08 ENCOUNTER — ASOB RESULT (OUTPATIENT)
Age: 34
End: 2024-05-08

## 2024-05-08 VITALS
HEART RATE: 118 BPM | DIASTOLIC BLOOD PRESSURE: 76 MMHG | HEIGHT: 70 IN | BODY MASS INDEX: 38.08 KG/M2 | SYSTOLIC BLOOD PRESSURE: 128 MMHG | WEIGHT: 266 LBS

## 2024-05-08 DIAGNOSIS — Z3A.23 23 WEEKS GESTATION OF PREGNANCY: ICD-10-CM

## 2024-05-08 DIAGNOSIS — Z3A.24 24 WEEKS GESTATION OF PREGNANCY: ICD-10-CM

## 2024-05-08 PROCEDURE — 76816 OB US FOLLOW-UP PER FETUS: CPT

## 2024-05-08 PROCEDURE — 99215 OFFICE O/P EST HI 40 MIN: CPT | Mod: 25

## 2024-05-08 NOTE — DISCUSSION/SUMMARY
[FreeTextEntry1] : 33-year-old gravida1 para 0 LMP 11/18.23 [place] 8/24/2024 presents for follow up consultation at 24 weeks and 3 days for inherited prothrombin deficiency, currently managed by Dr. Smith.  Per patient, her mother has a history of prothrombin mutation and lupus anticoagulant positive that was found after she was noted to have multiple DVTs in her leg. Her mother also has had multiple first trimester miscarriages. Patients declines personal history of VTE or miscarriages. Patients states she had a cleft lip that resolved in utero. This is a naturally conceived pregnancy.   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  Family history of first degree relative with VTE and obesity make her eligible for prophylaxis postpartum to which she agrees but prefers oral prophylaxis. Warfarin is safe during breastfeeding, so that may be prophylaxis of choice, despite need for monitoring.   #Obesity  She has gained 7 lb. this pregnancy. Our goal is to keep total weight gain to 15.  Met with Ms. Hernandez and the father of the baby. She feels well and the baby is active. /76 P 118 weight 266 lb. Anatomy scan completed. Fetal echo to be done. The couple were counselled and sent home with advice.

## 2024-05-14 ENCOUNTER — APPOINTMENT (OUTPATIENT)
Dept: OBGYN | Facility: CLINIC | Age: 34
End: 2024-05-14
Payer: COMMERCIAL

## 2024-05-14 VITALS
SYSTOLIC BLOOD PRESSURE: 97 MMHG | WEIGHT: 270 LBS | HEART RATE: 92 BPM | DIASTOLIC BLOOD PRESSURE: 72 MMHG | BODY MASS INDEX: 38.65 KG/M2 | HEIGHT: 70 IN

## 2024-05-14 DIAGNOSIS — Z34.90 ENCOUNTER FOR SUPERVISION OF NORMAL PREGNANCY, UNSPECIFIED, UNSPECIFIED TRIMESTER: ICD-10-CM

## 2024-05-14 PROCEDURE — 0502F SUBSEQUENT PRENATAL CARE: CPT

## 2024-05-14 PROCEDURE — 81003 URINALYSIS AUTO W/O SCOPE: CPT | Mod: NC,QW

## 2024-05-15 ENCOUNTER — APPOINTMENT (OUTPATIENT)
Dept: PEDIATRIC CARDIOLOGY | Facility: CLINIC | Age: 34
End: 2024-05-15
Payer: COMMERCIAL

## 2024-05-15 PROCEDURE — 99205 OFFICE O/P NEW HI 60 MIN: CPT | Mod: 25

## 2024-05-15 PROCEDURE — 76827 ECHO EXAM OF FETAL HEART: CPT

## 2024-05-15 PROCEDURE — 93325 DOPPLER ECHO COLOR FLOW MAPG: CPT

## 2024-05-15 PROCEDURE — 76825 ECHO EXAM OF FETAL HEART: CPT

## 2024-05-15 NOTE — HISTORY OF PRESENT ILLNESS
[FreeTextEntry1] : Dear Dr. ALEE AMOR,  I had the pleasure of seeing your patient, VALENCIA PEARCE, in my office today, 05/15/2024. She was referred to pediatric cardiology for fetal echocardiogram.  Limited visualization of heart on routine imaging. VALENCIA has been doing well from a clinical standpoint. There is no family history of congenital heart disease.  Today's echocardiogram was normal. Please see attached report.

## 2024-05-15 NOTE — DISCUSSION/SUMMARY
[FreeTextEntry1] : Normal study Reassurance Recommend routine prenatal care and delivery  Please do not hesitate to contact me if you have any questions.   Neri Small MD, MS, FAAP, FACC Attending Physician, Pediatric Cardiology [facility] [address] [place] [address] Office: [phone] Fax: [phone] Email: [email or web address]     I have spent 60 minutes of time on the encounter excluding separately reported services.

## 2024-05-30 LAB
BASOPHILS # BLD AUTO: 0.03 K/UL
BASOPHILS NFR BLD AUTO: 0.3 %
EOSINOPHIL # BLD AUTO: 0.09 K/UL
EOSINOPHIL NFR BLD AUTO: 1 %
GLUCOSE 1H P 50 G GLC PO SERPL-MCNC: 112 MG/DL
HCT VFR BLD CALC: 33.7 %
HGB BLD-MCNC: 10.9 G/DL
IMM GRANULOCYTES NFR BLD AUTO: 0.2 %
LYMPHOCYTES # BLD AUTO: 1.98 K/UL
LYMPHOCYTES NFR BLD AUTO: 23 %
MAN DIFF?: NORMAL
MCHC RBC-ENTMCNC: 30.3 PG
MCHC RBC-ENTMCNC: 32.3 G/DL
MCV RBC AUTO: 93.6 FL
MONOCYTES # BLD AUTO: 0.61 K/UL
MONOCYTES NFR BLD AUTO: 7.1 %
NEUTROPHILS # BLD AUTO: 5.89 K/UL
NEUTROPHILS NFR BLD AUTO: 68.4 %
PLATELET # BLD AUTO: 297 K/UL
PMV BLD AUTO: 0 /100 WBCS
RBC # BLD: 3.6 M/UL
RBC # FLD: 13.3 %
WBC # FLD AUTO: 8.62 K/UL

## 2024-06-06 ENCOUNTER — APPOINTMENT (OUTPATIENT)
Dept: OBGYN | Facility: CLINIC | Age: 34
End: 2024-06-06
Payer: COMMERCIAL

## 2024-06-06 VITALS
HEIGHT: 70 IN | DIASTOLIC BLOOD PRESSURE: 76 MMHG | BODY MASS INDEX: 38.51 KG/M2 | SYSTOLIC BLOOD PRESSURE: 109 MMHG | WEIGHT: 269 LBS | HEART RATE: 107 BPM

## 2024-06-06 PROCEDURE — 0502F SUBSEQUENT PRENATAL CARE: CPT

## 2024-06-06 PROCEDURE — 81003 URINALYSIS AUTO W/O SCOPE: CPT | Mod: NC,QW

## 2024-06-20 ENCOUNTER — APPOINTMENT (OUTPATIENT)
Dept: OBGYN | Facility: CLINIC | Age: 34
End: 2024-06-20
Payer: COMMERCIAL

## 2024-06-20 VITALS
HEIGHT: 70 IN | DIASTOLIC BLOOD PRESSURE: 80 MMHG | HEART RATE: 85 BPM | WEIGHT: 270 LBS | SYSTOLIC BLOOD PRESSURE: 123 MMHG | BODY MASS INDEX: 38.65 KG/M2

## 2024-06-20 LAB
BILIRUB UR QL STRIP: NORMAL
CLARITY UR: CLEAR
COLLECTION METHOD: NORMAL
GLUCOSE UR-MCNC: NORMAL
HCG UR QL: 0.2 EU/DL
HGB UR QL STRIP.AUTO: NORMAL
KETONES UR-MCNC: NORMAL
LEUKOCYTE ESTERASE UR QL STRIP: NORMAL
NITRITE UR QL STRIP: NORMAL
PH UR STRIP: 7.5
PROT UR STRIP-MCNC: NORMAL
SP GR UR STRIP: 1.01

## 2024-06-20 PROCEDURE — 81003 URINALYSIS AUTO W/O SCOPE: CPT | Mod: NC,QW

## 2024-06-20 PROCEDURE — 0502F SUBSEQUENT PRENATAL CARE: CPT

## 2024-07-02 ENCOUNTER — APPOINTMENT (OUTPATIENT)
Dept: ANTEPARTUM | Facility: CLINIC | Age: 34
End: 2024-07-02
Payer: COMMERCIAL

## 2024-07-02 ENCOUNTER — ASOB RESULT (OUTPATIENT)
Age: 34
End: 2024-07-02

## 2024-07-02 VITALS
SYSTOLIC BLOOD PRESSURE: 131 MMHG | DIASTOLIC BLOOD PRESSURE: 80 MMHG | HEIGHT: 70 IN | BODY MASS INDEX: 38.65 KG/M2 | WEIGHT: 270 LBS | HEART RATE: 88 BPM

## 2024-07-02 PROCEDURE — 76816 OB US FOLLOW-UP PER FETUS: CPT

## 2024-07-08 ENCOUNTER — APPOINTMENT (OUTPATIENT)
Dept: OBGYN | Facility: CLINIC | Age: 34
End: 2024-07-08
Payer: COMMERCIAL

## 2024-07-08 VITALS
HEIGHT: 70 IN | HEART RATE: 116 BPM | DIASTOLIC BLOOD PRESSURE: 79 MMHG | BODY MASS INDEX: 38.94 KG/M2 | SYSTOLIC BLOOD PRESSURE: 115 MMHG | WEIGHT: 272 LBS

## 2024-07-08 LAB
BILIRUB UR QL STRIP: NORMAL
CLARITY UR: CLEAR
COLLECTION METHOD: NORMAL
GLUCOSE UR-MCNC: NORMAL
HCG UR QL: 0.2 EU/DL
KETONES UR-MCNC: NORMAL
NITRITE UR QL STRIP: NORMAL
PH UR STRIP: 7
PROT UR STRIP-MCNC: NORMAL
SP GR UR STRIP: 1.01

## 2024-07-08 PROCEDURE — 81003 URINALYSIS AUTO W/O SCOPE: CPT | Mod: NC,QW

## 2024-07-08 PROCEDURE — 0502F SUBSEQUENT PRENATAL CARE: CPT

## 2024-07-17 ENCOUNTER — ASOB RESULT (OUTPATIENT)
Age: 34
End: 2024-07-17

## 2024-07-17 ENCOUNTER — APPOINTMENT (OUTPATIENT)
Dept: ANTEPARTUM | Facility: CLINIC | Age: 34
End: 2024-07-17
Payer: COMMERCIAL

## 2024-07-17 VITALS
WEIGHT: 272 LBS | HEIGHT: 70 IN | SYSTOLIC BLOOD PRESSURE: 117 MMHG | DIASTOLIC BLOOD PRESSURE: 76 MMHG | HEART RATE: 87 BPM | BODY MASS INDEX: 38.94 KG/M2

## 2024-07-17 PROCEDURE — 76819 FETAL BIOPHYS PROFIL W/O NST: CPT

## 2024-07-18 ENCOUNTER — APPOINTMENT (OUTPATIENT)
Dept: OBGYN | Facility: CLINIC | Age: 34
End: 2024-07-18
Payer: COMMERCIAL

## 2024-07-18 VITALS
SYSTOLIC BLOOD PRESSURE: 119 MMHG | HEART RATE: 111 BPM | WEIGHT: 273 LBS | BODY MASS INDEX: 39.08 KG/M2 | DIASTOLIC BLOOD PRESSURE: 81 MMHG | HEIGHT: 70 IN

## 2024-07-18 LAB
BILIRUB UR QL STRIP: NORMAL
CLARITY UR: CLEAR
COLLECTION METHOD: NORMAL
GLUCOSE UR-MCNC: NORMAL
HCG UR QL: 0.2 EU/DL
HGB UR QL STRIP.AUTO: NORMAL
KETONES UR-MCNC: NORMAL
LEUKOCYTE ESTERASE UR QL STRIP: NORMAL
NITRITE UR QL STRIP: NORMAL
PH UR STRIP: 6.5
PROT UR STRIP-MCNC: NORMAL
SP GR UR STRIP: 1.01

## 2024-07-18 PROCEDURE — 81003 URINALYSIS AUTO W/O SCOPE: CPT | Mod: NC,QW

## 2024-07-18 PROCEDURE — 0502F SUBSEQUENT PRENATAL CARE: CPT

## 2024-07-22 ENCOUNTER — OUTPATIENT (OUTPATIENT)
Dept: INPATIENT UNIT | Facility: HOSPITAL | Age: 34
LOS: 1 days | Discharge: HOME | End: 2024-07-22
Payer: COMMERCIAL

## 2024-07-22 VITALS
DIASTOLIC BLOOD PRESSURE: 68 MMHG | TEMPERATURE: 98 F | RESPIRATION RATE: 14 BRPM | HEART RATE: 88 BPM | SYSTOLIC BLOOD PRESSURE: 133 MMHG

## 2024-07-22 VITALS — DIASTOLIC BLOOD PRESSURE: 68 MMHG | SYSTOLIC BLOOD PRESSURE: 133 MMHG | HEART RATE: 88 BPM

## 2024-07-22 DIAGNOSIS — O26.899 OTHER SPECIFIED PREGNANCY RELATED CONDITIONS, UNSPECIFIED TRIMESTER: ICD-10-CM

## 2024-07-22 LAB
BASOPHILS # BLD AUTO: 0.02 K/UL — SIGNIFICANT CHANGE UP (ref 0–0.2)
BASOPHILS NFR BLD AUTO: 0.2 % — SIGNIFICANT CHANGE UP (ref 0–1)
EOSINOPHIL # BLD AUTO: 0.07 K/UL — SIGNIFICANT CHANGE UP (ref 0–0.7)
EOSINOPHIL NFR BLD AUTO: 0.8 % — SIGNIFICANT CHANGE UP (ref 0–8)
HCT VFR BLD CALC: 33.6 % — LOW (ref 37–47)
HGB BLD-MCNC: 11 G/DL — LOW (ref 12–16)
HIV 1 & 2 AB SERPL IA.RAPID: SIGNIFICANT CHANGE UP
IMM GRANULOCYTES NFR BLD AUTO: 0.3 % — SIGNIFICANT CHANGE UP (ref 0.1–0.3)
LYMPHOCYTES # BLD AUTO: 1.98 K/UL — SIGNIFICANT CHANGE UP (ref 1.2–3.4)
LYMPHOCYTES # BLD AUTO: 22.4 % — SIGNIFICANT CHANGE UP (ref 20.5–51.1)
MCHC RBC-ENTMCNC: 29 PG — SIGNIFICANT CHANGE UP (ref 27–31)
MCHC RBC-ENTMCNC: 32.7 G/DL — SIGNIFICANT CHANGE UP (ref 32–37)
MCV RBC AUTO: 88.7 FL — SIGNIFICANT CHANGE UP (ref 81–99)
MONOCYTES # BLD AUTO: 0.87 K/UL — HIGH (ref 0.1–0.6)
MONOCYTES NFR BLD AUTO: 9.9 % — HIGH (ref 1.7–9.3)
NEUTROPHILS # BLD AUTO: 5.86 K/UL — SIGNIFICANT CHANGE UP (ref 1.4–6.5)
NEUTROPHILS NFR BLD AUTO: 66.4 % — SIGNIFICANT CHANGE UP (ref 42.2–75.2)
NRBC # BLD: 0 /100 WBCS — SIGNIFICANT CHANGE UP (ref 0–0)
PLATELET # BLD AUTO: 284 K/UL — SIGNIFICANT CHANGE UP (ref 130–400)
PMV BLD: 11.1 FL — HIGH (ref 7.4–10.4)
PRENATAL SYPHILIS TEST: SIGNIFICANT CHANGE UP
RBC # BLD: 3.79 M/UL — LOW (ref 4.2–5.4)
RBC # FLD: 13.2 % — SIGNIFICANT CHANGE UP (ref 11.5–14.5)
WBC # BLD: 8.83 K/UL — SIGNIFICANT CHANGE UP (ref 4.8–10.8)
WBC # FLD AUTO: 8.83 K/UL — SIGNIFICANT CHANGE UP (ref 4.8–10.8)

## 2024-07-22 PROCEDURE — 96360 HYDRATION IV INFUSION INIT: CPT

## 2024-07-22 PROCEDURE — 86592 SYPHILIS TEST NON-TREP QUAL: CPT

## 2024-07-22 PROCEDURE — 86901 BLOOD TYPING SEROLOGIC RH(D): CPT

## 2024-07-22 PROCEDURE — 59025 FETAL NON-STRESS TEST: CPT

## 2024-07-22 PROCEDURE — 86850 RBC ANTIBODY SCREEN: CPT

## 2024-07-22 PROCEDURE — 99214 OFFICE O/P EST MOD 30 MIN: CPT

## 2024-07-22 PROCEDURE — 86703 HIV-1/HIV-2 1 RESULT ANTBDY: CPT

## 2024-07-22 PROCEDURE — 36415 COLL VENOUS BLD VENIPUNCTURE: CPT

## 2024-07-22 PROCEDURE — 86900 BLOOD TYPING SEROLOGIC ABO: CPT

## 2024-07-22 PROCEDURE — 87653 STREP B DNA AMP PROBE: CPT

## 2024-07-22 PROCEDURE — 85025 COMPLETE CBC W/AUTO DIFF WBC: CPT

## 2024-07-22 PROCEDURE — 96361 HYDRATE IV INFUSION ADD-ON: CPT

## 2024-07-22 RX ORDER — DEXTROSE MONOHYDRATE AND SODIUM CHLORIDE 5; .3 G/100ML; G/100ML
1000 INJECTION, SOLUTION INTRAVENOUS
Refills: 0 | Status: DISCONTINUED | OUTPATIENT
Start: 2024-07-22 | End: 2024-07-22

## 2024-07-22 NOTE — OB PROVIDER TRIAGE NOTE - NSHPLABSRESULTS_GEN_ALL_CORE
02/02  HepB NR  HepC NR  rubella immune  RPR neg  HIV neg  Type O pos, Abs neg    SONOs:   32w3d: vtx, anterior placenta, mvp 4.42cm, EFW 1980g (41%)  16w2d: variable. anterior placenta, mvp 36.91cm, 3VC, normal limited anatomy, limited caridac views  Fetal Echo normal

## 2024-07-22 NOTE — OB RN TRIAGE NOTE - NS_TRIAGETIMEOFMEDICALSCREENING_OBGYN_ALL_OB_DT
Will call patient if results are positive. She also has the [employer] neo and can check for negative results there.     Follow up if you become symptomatic as discussed.  
22-Jul-2024 11:18

## 2024-07-22 NOTE — OB PROVIDER TRIAGE NOTE - NSHPPHYSICALEXAM_GEN_ALL_CORE
T(C): 36.9 (07-22-24 @ 10:57), Max: 36.9 (07-22-24 @ 10:57)  HR: 88 (07-22-24 @ 11:01) (88 - 88)  BP: 133/68 (07-22-24 @ 11:01) (133/68 - 133/68)  RR: 14 (07-22-24 @ 10:57) (14 - 14)      Gen: A+OX3. NAD  Abd: Soft, Nontender. Gravid.  SVE:  1.5/70/-2    FHR: 150/mod/+accel  TOCO: q5-6min

## 2024-07-22 NOTE — OB PROVIDER TRIAGE NOTE - NSOBPROVIDERNOTE_OBGYN_ALL_OB_FT
Lead inserted, under fluorscopic guidance, into the coronary sinus. 32yo  at 35w2d, GBS unknown, presents for decreased fetal movement, now resolved, for r/o PTL.  - cont EFM & TOCO  - Cont vitals  - IV fluids and admission labs drawn  - f/u GBS  - recheck in 2 hours 34yo  at 35w2d, GBS unknown, presents for decreased fetal movement, now resolved, for r/o PTL.  - cont EFM & TOCO  - Cont vitals  - IV fluids and admission labs drawn  - f/u GBS  - recheck in 2 hours    ADDENDUM: Pt reevaluated at bedside. States doing well after receiving IV fluids, no complaints. Denies ctx, lof or vb. Endorses good FM. No contractions noted on TOCO. SVE unchanged at 1.5/70/-2. Reassuring maternal and fetal status, BPP 8/8. Discharged home with labor precautions. Encouraged maternal PO hydration. Follow up at next scheduled prenatal appt.

## 2024-07-22 NOTE — OB PROVIDER TRIAGE NOTE - HISTORY OF PRESENT ILLNESS
34yo  at 35w2d by LMP c/w 1st trimester sono, presents for decreased fetal movement. States since last night noting baby was moving less than normal. Also endorsing mild, irregular lower abdominal cramping since this morning. Denies any lof or vb. GBS unknown.  Pregnancy c/b:  #Inherited prothrombin deficiency  - Per MFM anticoagulation not indicated antepartum. Given FamHx and obesity, recommended prophylaxis postpartum.

## 2024-07-24 LAB
GROUP B BETA STREP DNA (PCR): SIGNIFICANT CHANGE UP
SOURCE GROUP B STREP: SIGNIFICANT CHANGE UP

## 2024-07-25 ENCOUNTER — APPOINTMENT (OUTPATIENT)
Dept: ANTEPARTUM | Facility: CLINIC | Age: 34
End: 2024-07-25
Payer: COMMERCIAL

## 2024-07-25 ENCOUNTER — ASOB RESULT (OUTPATIENT)
Age: 34
End: 2024-07-25

## 2024-07-25 VITALS
SYSTOLIC BLOOD PRESSURE: 103 MMHG | WEIGHT: 272 LBS | BODY MASS INDEX: 38.94 KG/M2 | HEART RATE: 99 BPM | HEIGHT: 70 IN | DIASTOLIC BLOOD PRESSURE: 72 MMHG

## 2024-07-25 DIAGNOSIS — Z3A.35 35 WEEKS GESTATION OF PREGNANCY: ICD-10-CM

## 2024-07-25 DIAGNOSIS — R10.30 LOWER ABDOMINAL PAIN, UNSPECIFIED: ICD-10-CM

## 2024-07-25 DIAGNOSIS — D68.2 HEREDITARY DEFICIENCY OF OTHER CLOTTING FACTORS: ICD-10-CM

## 2024-07-25 DIAGNOSIS — O99.113 OTHER DISEASES OF THE BLOOD AND BLOOD-FORMING ORGANS AND CERTAIN DISORDERS INVOLVING THE IMMUNE MECHANISM COMPLICATING PREGNANCY, THIRD TRIMESTER: ICD-10-CM

## 2024-07-25 DIAGNOSIS — O36.8130 DECREASED FETAL MOVEMENTS, THIRD TRIMESTER, NOT APPLICABLE OR UNSPECIFIED: ICD-10-CM

## 2024-07-25 DIAGNOSIS — O26.893 OTHER SPECIFIED PREGNANCY RELATED CONDITIONS, THIRD TRIMESTER: ICD-10-CM

## 2024-07-25 PROCEDURE — 76819 FETAL BIOPHYS PROFIL W/O NST: CPT

## 2024-08-01 ENCOUNTER — APPOINTMENT (OUTPATIENT)
Dept: ANTEPARTUM | Facility: CLINIC | Age: 34
End: 2024-08-01
Payer: COMMERCIAL

## 2024-08-01 ENCOUNTER — ASOB RESULT (OUTPATIENT)
Age: 34
End: 2024-08-01

## 2024-08-01 ENCOUNTER — APPOINTMENT (OUTPATIENT)
Dept: OBGYN | Facility: CLINIC | Age: 34
End: 2024-08-01
Payer: COMMERCIAL

## 2024-08-01 VITALS
SYSTOLIC BLOOD PRESSURE: 117 MMHG | HEIGHT: 70 IN | DIASTOLIC BLOOD PRESSURE: 78 MMHG | BODY MASS INDEX: 39.08 KG/M2 | HEART RATE: 97 BPM | WEIGHT: 273 LBS

## 2024-08-01 PROCEDURE — 76816 OB US FOLLOW-UP PER FETUS: CPT

## 2024-08-01 PROCEDURE — 76819 FETAL BIOPHYS PROFIL W/O NST: CPT | Mod: 59

## 2024-08-01 PROCEDURE — 0502F SUBSEQUENT PRENATAL CARE: CPT

## 2024-08-01 PROCEDURE — 81003 URINALYSIS AUTO W/O SCOPE: CPT | Mod: NC,QW

## 2024-08-08 ENCOUNTER — ASOB RESULT (OUTPATIENT)
Age: 34
End: 2024-08-08

## 2024-08-08 ENCOUNTER — APPOINTMENT (OUTPATIENT)
Dept: ANTEPARTUM | Facility: CLINIC | Age: 34
End: 2024-08-08

## 2024-08-08 ENCOUNTER — APPOINTMENT (OUTPATIENT)
Dept: OBGYN | Facility: CLINIC | Age: 34
End: 2024-08-08

## 2024-08-08 PROCEDURE — 81003 URINALYSIS AUTO W/O SCOPE: CPT | Mod: NC,QW

## 2024-08-08 PROCEDURE — 76819 FETAL BIOPHYS PROFIL W/O NST: CPT

## 2024-08-08 PROCEDURE — 0502F SUBSEQUENT PRENATAL CARE: CPT

## 2024-08-14 ENCOUNTER — APPOINTMENT (OUTPATIENT)
Dept: OBGYN | Facility: CLINIC | Age: 34
End: 2024-08-14

## 2024-08-15 ENCOUNTER — ASOB RESULT (OUTPATIENT)
Age: 34
End: 2024-08-15

## 2024-08-15 ENCOUNTER — APPOINTMENT (OUTPATIENT)
Dept: ANTEPARTUM | Facility: CLINIC | Age: 34
End: 2024-08-15
Payer: COMMERCIAL

## 2024-08-15 VITALS
DIASTOLIC BLOOD PRESSURE: 68 MMHG | BODY MASS INDEX: 39.65 KG/M2 | WEIGHT: 277 LBS | OXYGEN SATURATION: 98 % | HEIGHT: 70 IN | HEART RATE: 92 BPM | SYSTOLIC BLOOD PRESSURE: 110 MMHG

## 2024-08-15 DIAGNOSIS — E66.01 OBESITY COMPLICATING PREGNANCY, SECOND TRIMESTER: ICD-10-CM

## 2024-08-15 DIAGNOSIS — Z3A.39 39 WEEKS GESTATION OF PREGNANCY: ICD-10-CM

## 2024-08-15 DIAGNOSIS — O99.119 OTHER DISEASES OF THE BLOOD AND BLOOD-FORMING ORGANS AND CERTAIN DISORDERS INVOLVING THE IMMUNE MECHANISM COMPLICATING PREGNANCY, UNSPECIFIED TRIMESTER: ICD-10-CM

## 2024-08-15 DIAGNOSIS — O99.212 OBESITY COMPLICATING PREGNANCY, SECOND TRIMESTER: ICD-10-CM

## 2024-08-15 DIAGNOSIS — Z3A.24 24 WEEKS GESTATION OF PREGNANCY: ICD-10-CM

## 2024-08-15 DIAGNOSIS — O99.210 OBESITY COMPLICATING PREGNANCY, UNSPECIFIED TRIMESTER: ICD-10-CM

## 2024-08-15 DIAGNOSIS — D68.59 OTHER DISEASES OF THE BLOOD AND BLOOD-FORMING ORGANS AND CERTAIN DISORDERS INVOLVING THE IMMUNE MECHANISM COMPLICATING PREGNANCY, UNSPECIFIED TRIMESTER: ICD-10-CM

## 2024-08-15 PROBLEM — Z86.2 PERSONAL HISTORY OF DISEASES OF THE BLOOD AND BLOOD-FORMING ORGANS AND CERTAIN DISORDERS INVOLVING THE IMMUNE MECHANISM: Chronic | Status: ACTIVE | Noted: 2024-07-22

## 2024-08-15 PROCEDURE — 99211 OFF/OP EST MAY X REQ PHY/QHP: CPT | Mod: 25

## 2024-08-15 PROCEDURE — 76818 FETAL BIOPHYS PROFILE W/NST: CPT | Mod: 59

## 2024-08-15 PROCEDURE — 76816 OB US FOLLOW-UP PER FETUS: CPT

## 2024-08-15 PROCEDURE — 76820 UMBILICAL ARTERY ECHO: CPT | Mod: 59

## 2024-08-15 NOTE — DISCUSSION/SUMMARY
[FreeTextEntry1] : 33-year-old gravida1 para 0 LMP 11/18.23 [place] 8/24/2024 presents for follow up consultation at 38 weeks and 5 days prenatal care with by Dr. Smith. She feels well and the baby is active. BPP 10/10. She was counselled and sent home with advice.

## 2024-08-19 ENCOUNTER — INPATIENT (INPATIENT)
Facility: HOSPITAL | Age: 34
LOS: 1 days | Discharge: HOME | End: 2024-08-21
Attending: OBSTETRICS & GYNECOLOGY | Admitting: OBSTETRICS & GYNECOLOGY
Payer: COMMERCIAL

## 2024-08-19 VITALS
DIASTOLIC BLOOD PRESSURE: 70 MMHG | HEART RATE: 93 BPM | RESPIRATION RATE: 18 BRPM | TEMPERATURE: 101 F | SYSTOLIC BLOOD PRESSURE: 114 MMHG

## 2024-08-19 DIAGNOSIS — O26.893 OTHER SPECIFIED PREGNANCY RELATED CONDITIONS, THIRD TRIMESTER: ICD-10-CM

## 2024-08-19 DIAGNOSIS — O26.899 OTHER SPECIFIED PREGNANCY RELATED CONDITIONS, UNSPECIFIED TRIMESTER: ICD-10-CM

## 2024-08-19 LAB
AMPHET UR-MCNC: NEGATIVE — SIGNIFICANT CHANGE UP
APPEARANCE UR: CLEAR — SIGNIFICANT CHANGE UP
BARBITURATES UR SCN-MCNC: NEGATIVE — SIGNIFICANT CHANGE UP
BASOPHILS # BLD AUTO: 0.02 K/UL — SIGNIFICANT CHANGE UP (ref 0–0.2)
BASOPHILS NFR BLD AUTO: 0.2 % — SIGNIFICANT CHANGE UP (ref 0–1)
BENZODIAZ UR-MCNC: NEGATIVE — SIGNIFICANT CHANGE UP
BILIRUB UR-MCNC: NEGATIVE — SIGNIFICANT CHANGE UP
BUPRENORPHINE SCREEN, URINE RESULT: NEGATIVE — SIGNIFICANT CHANGE UP
COCAINE METAB.OTHER UR-MCNC: NEGATIVE — SIGNIFICANT CHANGE UP
COLOR SPEC: YELLOW — SIGNIFICANT CHANGE UP
DIFF PNL FLD: NEGATIVE — SIGNIFICANT CHANGE UP
EOSINOPHIL # BLD AUTO: 0.01 K/UL — SIGNIFICANT CHANGE UP (ref 0–0.7)
EOSINOPHIL NFR BLD AUTO: 0.1 % — SIGNIFICANT CHANGE UP (ref 0–8)
FENTANYL UR QL: NEGATIVE — SIGNIFICANT CHANGE UP
GLUCOSE UR QL: NEGATIVE MG/DL — SIGNIFICANT CHANGE UP
HCT VFR BLD CALC: 33.7 % — LOW (ref 37–47)
HGB BLD-MCNC: 11.2 G/DL — LOW (ref 12–16)
IMM GRANULOCYTES NFR BLD AUTO: 0.4 % — HIGH (ref 0.1–0.3)
KETONES UR-MCNC: NEGATIVE MG/DL — SIGNIFICANT CHANGE UP
L&D DRUG SCREEN, URINE: SIGNIFICANT CHANGE UP
LEUKOCYTE ESTERASE UR-ACNC: NEGATIVE — SIGNIFICANT CHANGE UP
LYMPHOCYTES # BLD AUTO: 1.48 K/UL — SIGNIFICANT CHANGE UP (ref 1.2–3.4)
LYMPHOCYTES # BLD AUTO: 11.6 % — LOW (ref 20.5–51.1)
MCHC RBC-ENTMCNC: 29.5 PG — SIGNIFICANT CHANGE UP (ref 27–31)
MCHC RBC-ENTMCNC: 33.2 G/DL — SIGNIFICANT CHANGE UP (ref 32–37)
MCV RBC AUTO: 88.7 FL — SIGNIFICANT CHANGE UP (ref 81–99)
METHADONE UR-MCNC: NEGATIVE — SIGNIFICANT CHANGE UP
MONOCYTES # BLD AUTO: 0.72 K/UL — HIGH (ref 0.1–0.6)
MONOCYTES NFR BLD AUTO: 5.7 % — SIGNIFICANT CHANGE UP (ref 1.7–9.3)
NEUTROPHILS # BLD AUTO: 10.46 K/UL — HIGH (ref 1.4–6.5)
NEUTROPHILS NFR BLD AUTO: 82 % — HIGH (ref 42.2–75.2)
NITRITE UR-MCNC: NEGATIVE — SIGNIFICANT CHANGE UP
NRBC # BLD: 0 /100 WBCS — SIGNIFICANT CHANGE UP (ref 0–0)
OPIATES UR-MCNC: NEGATIVE — SIGNIFICANT CHANGE UP
OXYCODONE UR-MCNC: NEGATIVE — SIGNIFICANT CHANGE UP
PCP UR-MCNC: NEGATIVE — SIGNIFICANT CHANGE UP
PH UR: 7 — SIGNIFICANT CHANGE UP (ref 5–8)
PLATELET # BLD AUTO: 276 K/UL — SIGNIFICANT CHANGE UP (ref 130–400)
PMV BLD: 11.7 FL — HIGH (ref 7.4–10.4)
PRENATAL SYPHILIS TEST: SIGNIFICANT CHANGE UP
PROPOXYPHENE QUALITATIVE URINE RESULT: NEGATIVE — SIGNIFICANT CHANGE UP
PROT UR-MCNC: NEGATIVE MG/DL — SIGNIFICANT CHANGE UP
RBC # BLD: 3.8 M/UL — LOW (ref 4.2–5.4)
RBC # FLD: 13.4 % — SIGNIFICANT CHANGE UP (ref 11.5–14.5)
SP GR SPEC: 1.01 — SIGNIFICANT CHANGE UP (ref 1–1.03)
UROBILINOGEN FLD QL: 0.2 MG/DL — SIGNIFICANT CHANGE UP (ref 0.2–1)
WBC # BLD: 12.74 K/UL — HIGH (ref 4.8–10.8)
WBC # FLD AUTO: 12.74 K/UL — HIGH (ref 4.8–10.8)

## 2024-08-19 PROCEDURE — 59400 OBSTETRICAL CARE: CPT | Mod: U9

## 2024-08-19 PROCEDURE — 80354 DRUG SCREENING FENTANYL: CPT

## 2024-08-19 PROCEDURE — 59050 FETAL MONITOR W/REPORT: CPT

## 2024-08-19 PROCEDURE — 85025 COMPLETE CBC W/AUTO DIFF WBC: CPT

## 2024-08-19 PROCEDURE — 80307 DRUG TEST PRSMV CHEM ANLYZR: CPT

## 2024-08-19 PROCEDURE — 86900 BLOOD TYPING SEROLOGIC ABO: CPT

## 2024-08-19 PROCEDURE — 36415 COLL VENOUS BLD VENIPUNCTURE: CPT

## 2024-08-19 PROCEDURE — 86592 SYPHILIS TEST NON-TREP QUAL: CPT

## 2024-08-19 PROCEDURE — 86850 RBC ANTIBODY SCREEN: CPT

## 2024-08-19 PROCEDURE — 86901 BLOOD TYPING SEROLOGIC RH(D): CPT

## 2024-08-19 PROCEDURE — 81003 URINALYSIS AUTO W/O SCOPE: CPT

## 2024-08-19 RX ORDER — ACETAMINOPHEN 325 MG/1
975 TABLET ORAL
Refills: 0 | Status: DISCONTINUED | OUTPATIENT
Start: 2024-08-19 | End: 2024-08-21

## 2024-08-19 RX ORDER — IBUPROFEN 600 MG
600 TABLET ORAL EVERY 6 HOURS
Refills: 0 | Status: DISCONTINUED | OUTPATIENT
Start: 2024-08-19 | End: 2024-08-21

## 2024-08-19 RX ORDER — WITCH HAZEL 1 G/ML
1 LIQUID TOPICAL EVERY 4 HOURS
Refills: 0 | Status: DISCONTINUED | OUTPATIENT
Start: 2024-08-19 | End: 2024-08-21

## 2024-08-19 RX ORDER — SODIUM CHLORIDE 9 MG/ML
3 INJECTION INTRAMUSCULAR; INTRAVENOUS; SUBCUTANEOUS EVERY 8 HOURS
Refills: 0 | Status: DISCONTINUED | OUTPATIENT
Start: 2024-08-19 | End: 2024-08-21

## 2024-08-19 RX ORDER — KETOROLAC TROMETHAMINE 30 MG/ML
30 INJECTION, SOLUTION INTRAMUSCULAR ONCE
Refills: 0 | Status: DISCONTINUED | OUTPATIENT
Start: 2024-08-19 | End: 2024-08-19

## 2024-08-19 RX ORDER — PRAMOXINE HCL 1 %
1 GEL (GRAM) TOPICAL EVERY 4 HOURS
Refills: 0 | Status: DISCONTINUED | OUTPATIENT
Start: 2024-08-19 | End: 2024-08-21

## 2024-08-19 RX ORDER — HYDROCORTISONE 1 %
1 OINTMENT (GRAM) TOPICAL EVERY 6 HOURS
Refills: 0 | Status: DISCONTINUED | OUTPATIENT
Start: 2024-08-19 | End: 2024-08-21

## 2024-08-19 RX ORDER — OXYCODONE HYDROCHLORIDE 5 MG/1
5 TABLET ORAL ONCE
Refills: 0 | Status: DISCONTINUED | OUTPATIENT
Start: 2024-08-19 | End: 2024-08-21

## 2024-08-19 RX ORDER — OXYTOCIN 10 UNIT/ML
333.33 AMPUL (ML) INJECTION
Qty: 20 | Refills: 0 | Status: DISCONTINUED | OUTPATIENT
Start: 2024-08-19 | End: 2024-08-19

## 2024-08-19 RX ORDER — TETANUS TOXOID, REDUCED DIPHTHERIA TOXOID AND ACELLULAR PERTUSSIS VACCINE, ADSORBED 5; 2.5; 8; 8; 2.5 [IU]/.5ML; [IU]/.5ML; UG/.5ML; UG/.5ML; UG/.5ML
0.5 SUSPENSION INTRAMUSCULAR ONCE
Refills: 0 | Status: DISCONTINUED | OUTPATIENT
Start: 2024-08-19 | End: 2024-08-21

## 2024-08-19 RX ORDER — IBUPROFEN 600 MG
600 TABLET ORAL EVERY 6 HOURS
Refills: 0 | Status: COMPLETED | OUTPATIENT
Start: 2024-08-19 | End: 2025-07-18

## 2024-08-19 RX ORDER — OXYCODONE HYDROCHLORIDE 5 MG/1
5 TABLET ORAL
Refills: 0 | Status: DISCONTINUED | OUTPATIENT
Start: 2024-08-19 | End: 2024-08-21

## 2024-08-19 RX ORDER — LANOLIN
1 OINTMENT (GRAM) TOPICAL EVERY 6 HOURS
Refills: 0 | Status: DISCONTINUED | OUTPATIENT
Start: 2024-08-19 | End: 2024-08-21

## 2024-08-19 RX ORDER — NALOXONE HCL 1 MG/ML
0.1 VIAL (ML) INJECTION
Refills: 0 | Status: DISCONTINUED | OUTPATIENT
Start: 2024-08-19 | End: 2024-08-19

## 2024-08-19 RX ORDER — ENOXAPARIN SODIUM 100 MG/ML
40 INJECTION SUBCUTANEOUS EVERY 24 HOURS
Refills: 0 | Status: DISCONTINUED | OUTPATIENT
Start: 2024-08-20 | End: 2024-08-21

## 2024-08-19 RX ORDER — ONDANSETRON 2 MG/ML
4 INJECTION, SOLUTION INTRAMUSCULAR; INTRAVENOUS EVERY 6 HOURS
Refills: 0 | Status: DISCONTINUED | OUTPATIENT
Start: 2024-08-19 | End: 2024-08-19

## 2024-08-19 RX ORDER — MENTHOL/CETYLPYRD CL 3 MG
1 LOZENGE MUCOUS MEMBRANE EVERY 6 HOURS
Refills: 0 | Status: DISCONTINUED | OUTPATIENT
Start: 2024-08-19 | End: 2024-08-21

## 2024-08-19 RX ORDER — OXYTOCIN 10 UNIT/ML
333.33 AMPUL (ML) INJECTION
Qty: 20 | Refills: 0 | Status: DISCONTINUED | OUTPATIENT
Start: 2024-08-19 | End: 2024-08-21

## 2024-08-19 RX ORDER — DEXAMETHASONE 0.75 MG
4 TABLET ORAL EVERY 6 HOURS
Refills: 0 | Status: DISCONTINUED | OUTPATIENT
Start: 2024-08-19 | End: 2024-08-19

## 2024-08-19 RX ORDER — DIPHENHYDRAMINE HCL 50 MG
25 CAPSULE ORAL EVERY 6 HOURS
Refills: 0 | Status: DISCONTINUED | OUTPATIENT
Start: 2024-08-19 | End: 2024-08-21

## 2024-08-19 RX ORDER — VITAMIN A, ASCORBIC ACID, VITAMIN D, .ALPHA.-TOCOPHEROL, THIAMINE MONONITRATE, RIBOFLAVIN, NIACIN, PYRIDOXINE HYDROCHLORIDE, FOLIC ACID, CYANOCOBALAMIN, CALCIUM, IRON, MAGNESIUM, ZINC, AND COPPER 2700; 70; 400; 30; 1.6; 1.8; 18; 2.5; 1; 12; 100; 65; 25; 25; 2 [IU]/1; MG/1; [IU]/1; [IU]/1; MG/1; MG/1; MG/1; MG/1; MG/1; UG/1; MG/1; MG/1; MG/1; MG/1; MG/1
1 TABLET ORAL DAILY
Refills: 0 | Status: DISCONTINUED | OUTPATIENT
Start: 2024-08-19 | End: 2024-08-21

## 2024-08-19 RX ADMIN — KETOROLAC TROMETHAMINE 30 MILLIGRAM(S): 30 INJECTION, SOLUTION INTRAMUSCULAR at 18:16

## 2024-08-19 RX ADMIN — ACETAMINOPHEN 975 MILLIGRAM(S): 325 TABLET ORAL at 21:49

## 2024-08-19 RX ADMIN — ACETAMINOPHEN 975 MILLIGRAM(S): 325 TABLET ORAL at 21:19

## 2024-08-19 RX ADMIN — SODIUM CHLORIDE 3 MILLILITER(S): 9 INJECTION INTRAMUSCULAR; INTRAVENOUS; SUBCUTANEOUS at 22:04

## 2024-08-19 NOTE — OB RN PATIENT PROFILE - NSSDOHTRANSPORT_OBGYN_A_OB
Physical Therapy Visit    Referred by: Sherry Wallace MD; Medical Diagnosis (from order):    Diagnosis Information      Diagnosis    929.9 (ICD-9-CM) - T14.8XXA (ICD-10-CM) - Crush injury              Visit: 8    Visit Type: Daily Treatment Note    SUBJECTIVE                                                                                                             Present and reporting subjective information: mother (sister)  Mom reports that Delfino was complaining again of pain in R leg, in morning.  Did not go to school that day.  Did xrays and lab work, with normal results.  He did go to school today.  Dad arrived for second half of the session.  Pain / Symptoms:  - Pain rating (out of 10): Current: 0     OBJECTIVE                                                                                                                    Observation:   Scars till raised, red, and shiny but improving  Behavior: follows 1 step commands and follows multi step commands Visual demonstration support command/follow directions  Muscle Tone:  LUE: WFL  LLE: WFL  Trunk: WFL  Neck: WFL  Modified Yonatan Scale   Better toe abduction and adduction AROM and strength  Better toe abduction and adduction AROM and strength  Palpation:     Range of Motion   Ankle:   - Dorsiflexion:      • Left: 10    Passive: 0 Knee extended resistance (R): R2: 15      • Right: 20    Passive: 15 Knee extended resistance (R): R2: 25    - Plantar Flexion:      • Left:   Passive: 50      • Right:   Passive: 50  Comments / Details: R toes:  Able to flex, extend, spread and move individually, more 2nd through 5th toes separate from 1st toe  L toes: Able to flex and extend 3rd through 5th toes through limited range  L 2nd toe:  Some passive flexion noted, approximately 50% of movement as compared to other toes  L great toe:  Very limited passive flexion; does have joint mobility through MTP joint with joint mobs    Muscle Flexibility/Length Testing:  -  Gastroc:  Left: minimal limitation Right: Normal  Comments/Details: Improving gastroc ROM           TREATMENT                                                                                                                  Therapeutic Exercise:  -Scar massage, joint mobs, STM  - Runner's stretch  - Heel raises, SLS, x10, wall for balance  - Toe exercises: abduction of all toes; isolated great to extension; isolated extension of toes 2-5 with great toe flexion  - Jump up on bench, x10  - Hopping on L foot, min-mod assist for multiple hops in a row for balance  - Step ups on tall bench, x10  - Kicking soccer ball    Gait Training:  Running in hallway, improved dafne, speed and symmetry    Skilled input: verbal instruction/cues, tactile instruction/cues and facilitation    Home Exercise Program: Verbal:  -Continue HEP previously given, modify toe raises to SLS  - Independent stairs at home, correct alignment  - Gentle scar massage   - Continue to try regular shoes  Added 11/11:   -toe abduction and extension (spread toes apart)  - towel crunches and picking up small items with feet in short sitting  - SL balance, heel raises, jumping on trampoline  - practice heel to toe walking pattern as able  Added 11/18   Add gastroc self standing stretches  Frog jumps to heel raise  Added 11/30:   Continue HEP above  More aggressive stretch, massage and joint mobs as tolerated  12/7 : Continue       ASSESSMENT                                                                                                               Delfino is a 6 y/o boy s/p epiphyseal fractures left hallux and 2nd toe with healing degloving injury on the digits. Crush injury occurred on escalator 9/11/22. Sutures removed 10/8/22. No activity restrictions.  No further follow up with surgery.    Delfino again has been complaining of pain in his R leg, with work up by MD.  Delfino has not complained of pain during sessions in the last 2 visit, and no compensations have  been noted during the sessions, but will continue to monitor.    Delfino continues to improve his gait pattern and running.  He did very well kicking a soccer ball today.  Heel raises, SLS and hopping were still difficult.  He made very nice attempts at isolated toe movements today, with limited success through L great toe.    Education:   - Results of above outlined education: Verbalizes understanding and Demonstrates understanding      PLAN                                                                                                                           Suggestions for next session as indicated: Progress per plan of care        GOALS                                                                                                                           Long Term Goals: to be met by end of plan of care  1. Patient to achieve 6 left active heel raises while standing at a support in order to demonstrate improved push off strength for walking and play.  Goal Attainment Scale (GAS)    -2: 0    -1: 3      0: per goal written above    +1: 9    +2: 12        GAS Key        -2=Much less than expected outcome (baseline); -1=Less than expected outcome (progressing);          0=Patient achieves expected outcome after intervention (goal, set at evaluation); +1=Better than          expected outcome; +2=Much better than expected outcome    Importance: 3  Difficulty: 3  Weight: 9    Baseline: -2  Achieved: -1 Change: 1     Status: progressing/ongoing At support SL left heel raise, slightly less elevated compared to right.  2. Patient demonstrates bilateral heel to toe gait pattern 50% of the time with 1 verbal cue across 50 foot pathway in order to decrease atypical gait pattern.  Goal Attainment Scale (GAS)    -2: 0%    -1: 25%       0: per goal written above    +1: 75%    +2: 100%    Importance: 3  Difficulty: 3  Weight: 9    Baseline: -2    Status: progressing/ongoing Decreased push off noted and unequal step length  3.  Patient runs on treadmill for 1 minute at at least 4.0mph to demonstrate improved higher level balance and endurance for participating in community motor activities.  Goal Attainment Scale (GAS)    -2: NT - 0 min    -1: 30 seconds      0: per goal written above    +1: 90 seconds    +2: 2 minutes    Importance: 3  Difficulty: 3  Weight: 9    Baseline: -2    Status: progressing/ongoing Able to run 10 seconds on treadmill   In session, noted to run with uneven step length short distances   GAS T Score Calculations:    Baseline: 22.61  4. Patient kicks a ball forward with either foot at least 10 feet towards a target 4 out 5 trials in order to demonstrate ability to play an age appropriate game with peers.     Status: met  Delfino is able to easily kick ball forward at a target with R foot.  He is able to kick a ball consistently at a target with his L foot as well, without hesitation or c/o pain.  5. Patient navigates up and down a full flight of stairs in reciprocal pattern, pointing both feet forward, with or without railing for safety in order to demonstrate improved independence with obstacle navigation around his community.     Status: progressing/ongoing Delfino is able to navigate up stairs with reciprocal pattern, both feet forward, no rail  Delfino is able to navigate down stairs with reciprocal pattern, with rail, but continues to compensate with rotation  6. Patient picks up and holds small items (I.e. corazon gras beads) 6 out of 10 trials with left foot for at least 2 seconds in order to demonstrate improved foot intrinsic musculature for standing stability.     Status: progressing/ongoing Momentary hold - uses 3-5th rays over first 2      Therapy procedure time and total treatment time can be found documented on the Time Entry flowsheet   no

## 2024-08-19 NOTE — PROCEDURE NOTE - NSANESTHEXAM_OBGYN_ALL_OB_FT
33 yr old  at 29w2d [place] 24 presents reporting LOF at 0400 upon waking up, clear, continuous since, with irregular ctx every 5- 15 min, varying in intensity, but mostly tolerable.  Pt reports (+) FM, denies VB, ctx. This pregnancy c/b  - PT heterozygous for the Factor I  - MTHFR Heterozygote, on folate.

## 2024-08-19 NOTE — OB PROVIDER H&P - HISTORY OF PRESENT ILLNESS
33 yr old  at 29w2d [place] 24 presents reporting LOF at 0400 upon waking up, clear, continuous since, with irregular ctx every 5- 15 min, varying in intensity, but mostly tolerable.  Pt reports (+) FM, denies VB, ctx. This pregnancy c/b      - PT heterozygous for the Factor II [id number] mutation, c.97G>A      fetal echo WNL,  needs lovenox PP; Pt saw Dr Bundy during pregnancy    - Antithrombin III "assay" = 80% (lower limits of nl); ATIII Ag is normal    - MTHFR Heterozygote, on folate.     -Fan hx mother with DVT; was on ASA 81 mg until 32 wks.    - Increased NT: 2.6mm is below upper limits of normal; Verifi low risk, patient declined invasive testing

## 2024-08-19 NOTE — PROCEDURE NOTE - ADDITIONAL PROCEDURE DETAILS
Epidural Note. Patient sitting. Lumbar epidural performed at L3-4. Pulse oximetry, NIBP, FHRM in place. Patient sitting. Sterile gloves, Chloro-prep. 1% lidocaine for local infiltration. ASIF 8.5cm. Flexitip Catheter threaded easily to 20cm. 17g Touhy needle removed. Epidural cathater pulled back and secured in place at 12 cm. Negative aspiration for CSF and blood. Test dose consisting of 3ml 1.5% lidocaine with epinephrine was negative. Remaining 2ml of test dose consisting of 1.5% lidocaine with epinephrine. Patient tolerated procedure and was hemodynamically stable throughout. 10 cc .125% bupivacaine epidural.  T10 level bilaterally. Uncomplicated procedure. Covering attending physician aware. Vitals per nursing epidural protocol. Epidural Note. Patient sitting. Lumbar epidural performed at L3-4. Pulse oximetry, NIBP, FHRM in place. Patient sitting. Sterile gloves, Chloro-prep. 1% lidocaine for local infiltration. ASIF 8.5cm. Flexitip Catheter threaded easily to 20cm. 17g Touhy needle removed. Epidural cathater pulled back and secured in place at 12 cm. Negative aspiration for CSF and blood. Test dose consisting of 3ml 1.5% lidocaine with epinephrine was negative. Remaining 2ml of test dose consisting of 1.5% lidocaine with epinephrine. Patient tolerated procedure and was hemodynamically stable throughout. 10 cc .125% bupivacaine epidural.  T10 level bilaterally. Uncomplicated procedure. Covering attending physician aware. Vitals per nursing epidural protocol.    Post Labor  Epidural/ Delivery  Evaluation Note:    Uncomplicated anesthetic for Vaginal Delivery.    Patient seen at bedside. Epidural to be removed by RN before patient transfer.  Patient moving B/L lower extremities.  Motor block appropriate and resolving. Vital Signs are stable. Pain well controlled.     Aury Score greater than 9    Mental Status:  __x__ Awake   ___x__ Alert   _____ Drowsy   _____ Sedated    Nausea/Vomiting:  __x__ NO  ______Yes,   See Post - Op Orders          Pain Scale (0-10):  _____    Treatment: __X__ None       Plan: Discharge:   ____Home       __X___Floor     _____Critical Care    _____

## 2024-08-19 NOTE — OB PROVIDER H&P - NSHPLABSRESULTS_GEN_ALL_CORE
7/22/24   GBS neg  O POS/neg  RPR nr  HIV nr      5/21     Factor V neg, SMN1 neg, AT III antigen 22 (n), protein C resistance assay (n), cardiolipin (n), AT III activity 80% (L), protein S antigen (n), B2G screen neg, rubella immune, Hb 5.4%, protein C 155% (H),  2/6: 8k>13/38%<323k BUN/SCr: 8/0.8; AST/ALT: 18/10.      2/2/24  HBSAg nr  HCV nr  rubella immune  RPR neg  O POS/neg  HIV nr      38w5d vtx, anterior, MVP 4.45 cm, BPP 10/10, EFW 3042 gms, 22%, UAD normal        2/6/24: SIUP at 11w3d (re[place] 8/24 by LMPc/w 11wCRL). NT 2.63 (99%ile)  3/11/24: Single fetus, variable lie at 16w2d. Ant plac. DVP 3.9cm, EFW 34th%ile. NF 2.5mm, anatomy normal limited.  4/11: SF BREECH at 20w5d. Ant plac. DVP 4.3cm. EFW 47th%ile. Anatomy incomplete.  24w4d breech, anterior, MVP 4.71 cm,   gms, 26%, f/u anatomy WNL  38w5d vtx, anterior, MVP 4.45 cm, BPP 10/10, EFW 3042 gms, 22%, UAD normal    5/15 Fetal echo normal

## 2024-08-19 NOTE — OB PROVIDER DELIVERY SUMMARY - NSPROVIDERDELIVERYNOTE_OBGYN_ALL_OB_FT
Patient was fully dilated and pushing. Fetal head was OA and restituted to ROT, no nuchal cord.  The anterior and posterior shoulders delivered, followed by the remaining body atraumatically. Delayed cord clamping was performed, and then clamped and cut. Cord blood gases collected x1. The  was handed to the mother and RN. The placenta delivered intact with membranes. Pitocin was administered. Uterus massaged, fundus found to be firm. Cervix, vagina and perineum inspected 2nd degree laceration was noted, repaired using chromic 2-0 in the usual fashion with good hemostasis.     Viable male infant delivered with APGARs 9/9        Dr. Smith present for the delivery

## 2024-08-19 NOTE — PROGRESS NOTE ADULT - SUBJECTIVE AND OBJECTIVE BOX
PGY 1 Note    Patient seen at bedside for evaluation of labor progression.  Reports ctx. Comfortable s/p epidural.    HR: 80 (14:52)  BP: 107/61 (14:52)    EFM: 145/mod/+accels  TOCO: q2mins  SVE: 9/100/+1    Labs:                        11.2   12.74 )-----------( 276      ( 19 Aug 2024 12:20 )             33.7           ABO RH Interpretation: O POS (24 @ 11:26)    Urinalysis Basic - ( 19 Aug 2024 10:10 )    Color: Yellow / Appearance: Clear / S.008 / pH: x  Gluc: x / Ketone: Negative mg/dL  / Bili: Negative / Urobili: 0.2 mg/dL   Blood: x / Protein: Negative mg/dL / Nitrite: Negative   Leuk Esterase: Negative / RBC: x / WBC x   Sq Epi: x / Non Sq Epi: x / Bacteria: x          Meds: fentanyl (2 MICROgram(s)/mL) + bupivacaine 0.0625%  in 0.9% Sodium Chloride PCEA 250 milliLiter(s) Epidural PCA Continuous  lactated ringers. 1000 milliLiter(s) IV Continuous <Continuous>  oxytocin Infusion 333.333 milliUNIT(s)/Min IV Continuous <Continuous>

## 2024-08-19 NOTE — OB PROVIDER H&P - NSLOWPPHRISK_OBGYN_A_OB
No previous uterine incision/Less than or equal to 4 previous vaginal births/No history of postpartum hemorrhage/No other PPH risks indicated

## 2024-08-19 NOTE — OB PROVIDER H&P - ASSESSMENT
33 yr old  at 39w2d Rh POS/GBS NEG; Factor II heterozygote SROM in early labor    Admit to L&D  IV Hydration and labs  Continuous TOCO and EFM  Medications as ordered  Pain management prn  Lovenox PP for Factor II    Dr Smith aware

## 2024-08-19 NOTE — OB PROVIDER H&P - NSICDXPASTMEDICALHX_GEN_ALL_CORE_FT
PAST MEDICAL HISTORY:  H/O prothrombin mutation     Methylenetetrahydrofolate reductase (MTHFR) deficiency

## 2024-08-19 NOTE — OB PROVIDER H&P - NSHPPHYSICALEXAM_GEN_ALL_CORE
Vital Signs (24 Hrs):  T(C): 38.4 (08-19-24 @ 06:51), Max: 38.4 (08-19-24 @ 06:51)  HR: 93 (08-19-24 @ 06:54) (93 - 93)  BP: 111/70 (08-19-24 @ 06:54) (111/70 - 114/70)  RR: 18 (08-19-24 @ 06:51) (18 - 18)    Gen: NAD  Abd: gravid, soft obese  VE: 4/80/-2, vtx, ruptured, clear  FHR: 145/mod/+accels  TOCO: irreg

## 2024-08-19 NOTE — OB PROVIDER DELIVERY SUMMARY - NSSELHIDDEN_OBGYN_ALL_OB_FT
[NS_DeliveryAttending1_OBGYN_ALL_OB_FT:[id number]],[NS_DeliveryAssist1_OBGYN_ALL_OB_FT:[id number]]

## 2024-08-19 NOTE — OB RN PATIENT PROFILE - FALL HARM RISK - UNIVERSAL INTERVENTIONS
Bed in lowest position, wheels locked, appropriate side rails in place/Call bell, personal items and telephone in reach/Non-slip footwear when patient is out of bed/[place] to call system/Physically safe environment - no spills, clutter or unnecessary equipment/Purposeful Proactive Rounding/Room/bathroom lighting operational, light cord in reach

## 2024-08-20 LAB
BASOPHILS # BLD AUTO: 0.03 K/UL — SIGNIFICANT CHANGE UP (ref 0–0.2)
BASOPHILS NFR BLD AUTO: 0.3 % — SIGNIFICANT CHANGE UP (ref 0–1)
EOSINOPHIL # BLD AUTO: 0.08 K/UL — SIGNIFICANT CHANGE UP (ref 0–0.7)
EOSINOPHIL NFR BLD AUTO: 0.7 % — SIGNIFICANT CHANGE UP (ref 0–8)
HCT VFR BLD CALC: 30 % — LOW (ref 37–47)
HGB BLD-MCNC: 9.6 G/DL — LOW (ref 12–16)
IMM GRANULOCYTES NFR BLD AUTO: 0.3 % — SIGNIFICANT CHANGE UP (ref 0.1–0.3)
LYMPHOCYTES # BLD AUTO: 19.1 % — LOW (ref 20.5–51.1)
LYMPHOCYTES # BLD AUTO: 2.06 K/UL — SIGNIFICANT CHANGE UP (ref 1.2–3.4)
MCHC RBC-ENTMCNC: 29.2 PG — SIGNIFICANT CHANGE UP (ref 27–31)
MCHC RBC-ENTMCNC: 32 G/DL — SIGNIFICANT CHANGE UP (ref 32–37)
MCV RBC AUTO: 91.2 FL — SIGNIFICANT CHANGE UP (ref 81–99)
MONOCYTES # BLD AUTO: 0.91 K/UL — HIGH (ref 0.1–0.6)
MONOCYTES NFR BLD AUTO: 8.4 % — SIGNIFICANT CHANGE UP (ref 1.7–9.3)
NEUTROPHILS # BLD AUTO: 7.67 K/UL — HIGH (ref 1.4–6.5)
NEUTROPHILS NFR BLD AUTO: 71.2 % — SIGNIFICANT CHANGE UP (ref 42.2–75.2)
NRBC # BLD: 0 /100 WBCS — SIGNIFICANT CHANGE UP (ref 0–0)
PLATELET # BLD AUTO: 243 K/UL — SIGNIFICANT CHANGE UP (ref 130–400)
PMV BLD: 11.3 FL — HIGH (ref 7.4–10.4)
RBC # BLD: 3.29 M/UL — LOW (ref 4.2–5.4)
RBC # FLD: 13.5 % — SIGNIFICANT CHANGE UP (ref 11.5–14.5)
WBC # BLD: 10.78 K/UL — SIGNIFICANT CHANGE UP (ref 4.8–10.8)
WBC # FLD AUTO: 10.78 K/UL — SIGNIFICANT CHANGE UP (ref 4.8–10.8)

## 2024-08-20 RX ORDER — ENOXAPARIN SODIUM 40 MG/.4ML
40 INJECTION, SOLUTION SUBCUTANEOUS DAILY
Qty: 60 | Refills: 0 | Status: ACTIVE | COMMUNITY
Start: 2024-08-20 | End: 1900-01-01

## 2024-08-20 RX ORDER — ENOXAPARIN SODIUM 40 MG/.4ML
40 INJECTION, SOLUTION SUBCUTANEOUS DAILY
Qty: 60 | Refills: 0 | Status: DISCONTINUED | COMMUNITY
Start: 2024-08-20 | End: 2024-08-20

## 2024-08-20 RX ADMIN — Medication 600 MILLIGRAM(S): at 06:59

## 2024-08-20 RX ADMIN — ACETAMINOPHEN 975 MILLIGRAM(S): 325 TABLET ORAL at 08:43

## 2024-08-20 RX ADMIN — ACETAMINOPHEN 975 MILLIGRAM(S): 325 TABLET ORAL at 09:05

## 2024-08-20 RX ADMIN — ACETAMINOPHEN 975 MILLIGRAM(S): 325 TABLET ORAL at 04:12

## 2024-08-20 RX ADMIN — Medication 600 MILLIGRAM(S): at 12:28

## 2024-08-20 RX ADMIN — Medication 600 MILLIGRAM(S): at 02:11

## 2024-08-20 RX ADMIN — ACETAMINOPHEN 975 MILLIGRAM(S): 325 TABLET ORAL at 14:49

## 2024-08-20 RX ADMIN — Medication 600 MILLIGRAM(S): at 06:29

## 2024-08-20 RX ADMIN — Medication 600 MILLIGRAM(S): at 18:11

## 2024-08-20 RX ADMIN — ACETAMINOPHEN 975 MILLIGRAM(S): 325 TABLET ORAL at 03:42

## 2024-08-20 RX ADMIN — Medication 600 MILLIGRAM(S): at 23:42

## 2024-08-20 RX ADMIN — Medication 30 MILLILITER(S): at 21:12

## 2024-08-20 RX ADMIN — Medication 600 MILLIGRAM(S): at 01:41

## 2024-08-20 RX ADMIN — SODIUM CHLORIDE 3 MILLILITER(S): 9 INJECTION INTRAMUSCULAR; INTRAVENOUS; SUBCUTANEOUS at 05:54

## 2024-08-20 RX ADMIN — ACETAMINOPHEN 975 MILLIGRAM(S): 325 TABLET ORAL at 21:13

## 2024-08-20 RX ADMIN — Medication 600 MILLIGRAM(S): at 13:12

## 2024-08-20 RX ADMIN — ACETAMINOPHEN 975 MILLIGRAM(S): 325 TABLET ORAL at 15:12

## 2024-08-20 RX ADMIN — ENOXAPARIN SODIUM 40 MILLIGRAM(S): 100 INJECTION SUBCUTANEOUS at 17:01

## 2024-08-20 RX ADMIN — SODIUM CHLORIDE 3 MILLILITER(S): 9 INJECTION INTRAMUSCULAR; INTRAVENOUS; SUBCUTANEOUS at 14:12

## 2024-08-20 RX ADMIN — VITAMIN A, ASCORBIC ACID, VITAMIN D, .ALPHA.-TOCOPHEROL, THIAMINE MONONITRATE, RIBOFLAVIN, NIACIN, PYRIDOXINE HYDROCHLORIDE, FOLIC ACID, CYANOCOBALAMIN, CALCIUM, IRON, MAGNESIUM, ZINC, AND COPPER 1 TABLET(S): 2700; 70; 400; 30; 1.6; 1.8; 18; 2.5; 1; 12; 100; 65; 25; 25; 2 TABLET ORAL at 12:28

## 2024-08-21 ENCOUNTER — TRANSCRIPTION ENCOUNTER (OUTPATIENT)
Age: 34
End: 2024-08-21

## 2024-08-21 ENCOUNTER — APPOINTMENT (OUTPATIENT)
Dept: ANTEPARTUM | Facility: CLINIC | Age: 34
End: 2024-08-21

## 2024-08-21 ENCOUNTER — APPOINTMENT (OUTPATIENT)
Dept: OBGYN | Facility: CLINIC | Age: 34
End: 2024-08-21

## 2024-08-21 VITALS
RESPIRATION RATE: 18 BRPM | HEART RATE: 78 BPM | TEMPERATURE: 98 F | DIASTOLIC BLOOD PRESSURE: 72 MMHG | SYSTOLIC BLOOD PRESSURE: 114 MMHG | OXYGEN SATURATION: 98 %

## 2024-08-21 RX ORDER — DOCUSATE CALCIUM 240 MG/1
1 CAPSULE ORAL
Qty: 14 | Refills: 0
Start: 2024-08-21 | End: 2024-08-27

## 2024-08-21 RX ORDER — ACETAMINOPHEN 325 MG/1
2 TABLET ORAL
Qty: 56 | Refills: 0
Start: 2024-08-21 | End: 2024-08-27

## 2024-08-21 RX ORDER — VITAMIN A, ASCORBIC ACID, VITAMIN D, .ALPHA.-TOCOPHEROL, THIAMINE MONONITRATE, RIBOFLAVIN, NIACIN, PYRIDOXINE HYDROCHLORIDE, FOLIC ACID, CYANOCOBALAMIN, CALCIUM, IRON, MAGNESIUM, ZINC, AND COPPER 2700; 70; 400; 30; 1.6; 1.8; 18; 2.5; 1; 12; 100; 65; 25; 25; 2 [IU]/1; MG/1; [IU]/1; [IU]/1; MG/1; MG/1; MG/1; MG/1; MG/1; UG/1; MG/1; MG/1; MG/1; MG/1; MG/1
1 TABLET ORAL
Qty: 0 | Refills: 0 | DISCHARGE
Start: 2024-08-21

## 2024-08-21 RX ORDER — IBUPROFEN 600 MG
1 TABLET ORAL
Qty: 28 | Refills: 0
Start: 2024-08-21 | End: 2024-08-27

## 2024-08-21 RX ADMIN — Medication 600 MILLIGRAM(S): at 06:08

## 2024-08-21 RX ADMIN — ACETAMINOPHEN 975 MILLIGRAM(S): 325 TABLET ORAL at 16:00

## 2024-08-21 RX ADMIN — ACETAMINOPHEN 975 MILLIGRAM(S): 325 TABLET ORAL at 03:13

## 2024-08-21 RX ADMIN — ACETAMINOPHEN 975 MILLIGRAM(S): 325 TABLET ORAL at 12:16

## 2024-08-21 RX ADMIN — VITAMIN A, ASCORBIC ACID, VITAMIN D, .ALPHA.-TOCOPHEROL, THIAMINE MONONITRATE, RIBOFLAVIN, NIACIN, PYRIDOXINE HYDROCHLORIDE, FOLIC ACID, CYANOCOBALAMIN, CALCIUM, IRON, MAGNESIUM, ZINC, AND COPPER 1 TABLET(S): 2700; 70; 400; 30; 1.6; 1.8; 18; 2.5; 1; 12; 100; 65; 25; 25; 2 TABLET ORAL at 12:15

## 2024-08-21 RX ADMIN — Medication 1 SPRAY(S): at 06:58

## 2024-08-21 RX ADMIN — Medication 1 APPLICATION(S): at 06:58

## 2024-08-21 RX ADMIN — Medication 600 MILLIGRAM(S): at 12:15

## 2024-08-21 RX ADMIN — ACETAMINOPHEN 975 MILLIGRAM(S): 325 TABLET ORAL at 08:48

## 2024-08-21 NOTE — PROGRESS NOTE ADULT - ASSESSMENT
S/P  PPD#2, recovering well  - D/C home and follow up in 6 weeks. 
33 yr old  at 39w2d Rh POS/GBS NEG; Factor II heterozygote SROM in early labor    IV Hydration  Continuous TOCO and EFM  Medications as ordered  Pain management prn  Lovenox PP for Factor II    Dr Smith aware

## 2024-08-21 NOTE — DISCHARGE NOTE OB - PATIENT PORTAL LINK FT
You can access the FollowMyHealth Patient Portal offered by [facility] by registering at the following website: [email or web address]. By joining [facility]’s FollowMyHealth portal, you will also be able to view your health information using other applications (apps) compatible with our system.

## 2024-08-21 NOTE — DISCHARGE NOTE OB - MATERIALS PROVIDED
Vaccinations/[facility]  Screening Program/  Immunization Record/Breastfeeding Log/Guide to Postpartum Care/[facility] Hearing Screen Program/Back To Sleep Handout/Shaken Baby Prevention Handout/Breastfeeding Guide and Packet/Birth Certificate Instructions

## 2024-08-21 NOTE — DISCHARGE NOTE OB - MEDICATION SUMMARY - MEDICATIONS TO TAKE
I will START or STAY ON the medications listed below when I get home from the hospital:    ibuprofen 600 mg oral tablet  -- 1 tab(s) by mouth every 6 hours  -- Indication: For pain    Tylenol 325 mg oral capsule  -- 2 cap(s) by mouth every 6 hours  -- Indication: For pain    Prenatal Multivitamins with Folic Acid 1 mg oral tablet  -- 1 tab(s) by mouth once a day  -- Indication: For prenatal    Colace 100 mg oral capsule  -- 1 cap(s) by mouth 2 times a day  -- Indication: For constipation    simethicone 80 mg oral tablet, chewable  -- 1 tab(s) chewed every 4 hours  -- Indication: For gas

## 2024-08-21 NOTE — DISCHARGE NOTE OB - CARE PROVIDER_API CALL
Anthony Smith JR  Obstetrics and Gynecology  [address], [address]  [place] [address]  Phone: [phone]  Fax: [phone]  Follow Up Time:

## 2024-08-21 NOTE — DISCHARGE NOTE OB - PLAN OF CARE
[FreeTextEntry1] : 61yo female LOV 6/16/21 presents for f/u evaluation of L IDC initially noted as a 2.6cm illdefined mass on mammogram with a 2.9cm sonographic correalte 2:00 5FN. Patient notes that she felt it 4 months ago and it has grown since then. FHx of breast ca Mcousin 40s, and M Aunt 50s. \par \par Patient is currently undergoing treatment with Dr. Fontenot. She completed two of six rounds of chemotherapy. SHe is getting treatment every 3 weeks. 
Nothing in the vagina for 6 weeks (no sex, no tampons, no douching). Avoid tub baths, you may shower.  If you have a fever of 100.4F or greater, severe vaginal bleeding, or severe abdominal pain, call your Ob/Gyn or come to the emergency department immediately.  Please follow up with your provider in 6 weeks for postpartum visit.

## 2024-08-21 NOTE — PROGRESS NOTE ADULT - SUBJECTIVE AND OBJECTIVE BOX
Patient seen and examined. Pain well controlled at this time. No complaints at this time. Denies fever, chills, nausea, vomiting, chest pain, shortness of breath, severe abdominal pain, heavy vaginal bleeding.   Patient is ambulating. Passing flatus.   Tolerating regular diet   Voiding without difficulty, no dysuria     Physical Exam  Vital Signs Last 24 Hrs  T(C): 36.6 (21 Aug 2024 08:01), Max: 36.7 (20 Aug 2024 15:20)  T(F): 97.9 (21 Aug 2024 08:01), Max: 98.1 (20 Aug 2024 23:52)  HR: 78 (21 Aug 2024 08:01) (78 - 83)  BP: 114/72 (21 Aug 2024 08:01) (106/66 - 120/81)  BP(mean): --  RR: 18 (21 Aug 2024 08:01) (18 - 18)  SpO2: 98% (21 Aug 2024 08:01) (96% - 98%)    Parameters below as of 21 Aug 2024 08:01  Patient On (Oxygen Delivery Method): room air      Gen: AAOx3, NAD  Cardio: RRR, S1S2, no M/R/G  Resp: CTAB  Ext: No calf tenderness, no swelling  Fundus: firm, below umbilicus. Nontender.   Abd: Soft, nontender, nondistended, BS+  Lochia: minimal      Labs:                        9.6    10.78 )-----------( 243      ( 20 Aug 2024 06:30 )             30.0                         11.2   12.74 )-----------( 276      ( 19 Aug 2024 12:20 )             33.7

## 2024-08-22 PROBLEM — E72.12 METHYLENETETRAHYDROFOLATE REDUCTASE DEFICIENCY: Chronic | Status: ACTIVE | Noted: 2024-08-19

## 2024-08-27 DIAGNOSIS — E72.12 METHYLENETETRAHYDROFOLATE REDUCTASE DEFICIENCY: ICD-10-CM

## 2024-08-27 DIAGNOSIS — Z79.82 LONG TERM (CURRENT) USE OF ASPIRIN: ICD-10-CM

## 2024-08-27 DIAGNOSIS — Z3A.39 39 WEEKS GESTATION OF PREGNANCY: ICD-10-CM

## 2024-08-27 DIAGNOSIS — O36.0930 MATERNAL CARE FOR OTHER RHESUS ISOIMMUNIZATION, THIRD TRIMESTER, NOT APPLICABLE OR UNSPECIFIED: ICD-10-CM

## 2024-08-27 DIAGNOSIS — Z86.718 PERSONAL HISTORY OF OTHER VENOUS THROMBOSIS AND EMBOLISM: ICD-10-CM

## 2024-08-27 DIAGNOSIS — Z28.09 IMMUNIZATION NOT CARRIED OUT BECAUSE OF OTHER CONTRAINDICATION: ICD-10-CM

## 2024-08-27 DIAGNOSIS — D68.2 HEREDITARY DEFICIENCY OF OTHER CLOTTING FACTORS: ICD-10-CM

## 2024-09-30 ENCOUNTER — APPOINTMENT (OUTPATIENT)
Dept: OBGYN | Facility: CLINIC | Age: 34
End: 2024-09-30
Payer: COMMERCIAL

## 2024-09-30 VITALS
HEART RATE: 57 BPM | HEIGHT: 70 IN | WEIGHT: 260 LBS | SYSTOLIC BLOOD PRESSURE: 119 MMHG | BODY MASS INDEX: 37.22 KG/M2 | DIASTOLIC BLOOD PRESSURE: 82 MMHG

## 2024-09-30 DIAGNOSIS — Z01.419 ENCOUNTER FOR GYNECOLOGICAL EXAMINATION (GENERAL) (ROUTINE) W/OUT ABNORMAL FINDINGS: ICD-10-CM

## 2024-09-30 PROCEDURE — 0503F POSTPARTUM CARE VISIT: CPT

## 2024-09-30 NOTE — HISTORY OF PRESENT ILLNESS
[Postpartum Follow Up] : postpartum follow up [BF with Difficulty] : nursing with difficulty [Back to Normal] : is back to normal in size [None] : no vaginal bleeding [Normal] : the vagina was normal [Healing Well] : is healing well [Cervix Sample Taken] : cervical sample taken for a Pap smear [Not Done] : Examination of breasts not done [FreeTextEntry1] : Patient in office for postpartum exam.  24 baby boy 7 lbs 1 oz. doing well stopped the Lovenox. no period yet. second degree perineal tear well healed pap performed rto 1 year. to use condoms for contraception

## 2024-10-07 LAB
CYTOLOGY CVX/VAG DOC THIN PREP: NORMAL
HPV HIGH+LOW RISK DNA PNL CVX: NOT DETECTED

## 2024-12-04 ENCOUNTER — NON-APPOINTMENT (OUTPATIENT)
Age: 34
End: 2024-12-04
